# Patient Record
Sex: MALE | Race: WHITE | URBAN - METROPOLITAN AREA
[De-identification: names, ages, dates, MRNs, and addresses within clinical notes are randomized per-mention and may not be internally consistent; named-entity substitution may affect disease eponyms.]

---

## 2017-04-25 PROBLEM — G89.29 CHRONIC NECK PAIN: Status: ACTIVE | Noted: 2017-04-25

## 2017-04-25 PROBLEM — M54.2 CHRONIC NECK PAIN: Status: ACTIVE | Noted: 2017-04-25

## 2018-04-25 ENCOUNTER — OFFICE VISIT (OUTPATIENT)
Dept: FAMILY MEDICINE CLINIC | Age: 84
End: 2018-04-25
Payer: MEDICARE

## 2018-04-25 VITALS
HEART RATE: 82 BPM | WEIGHT: 184 LBS | HEIGHT: 73 IN | OXYGEN SATURATION: 98 % | SYSTOLIC BLOOD PRESSURE: 124 MMHG | BODY MASS INDEX: 24.39 KG/M2 | DIASTOLIC BLOOD PRESSURE: 70 MMHG | TEMPERATURE: 97.7 F

## 2018-04-25 DIAGNOSIS — R73.9 ELEVATED BLOOD SUGAR: ICD-10-CM

## 2018-04-25 DIAGNOSIS — D69.6 THROMBOCYTOPENIA (HCC): ICD-10-CM

## 2018-04-25 DIAGNOSIS — N18.30 CKD (CHRONIC KIDNEY DISEASE) STAGE 3, GFR 30-59 ML/MIN (HCC): ICD-10-CM

## 2018-04-25 DIAGNOSIS — E11.9 DIABETES MELLITUS TYPE 2, NONINSULIN DEPENDENT (HCC): ICD-10-CM

## 2018-04-25 DIAGNOSIS — I10 ESSENTIAL HYPERTENSION: Primary | ICD-10-CM

## 2018-04-25 LAB — GLUCOSE BLD-MCNC: 96 MG/DL

## 2018-04-25 PROCEDURE — 82962 GLUCOSE BLOOD TEST: CPT | Performed by: FAMILY MEDICINE

## 2018-04-25 PROCEDURE — G8427 DOCREV CUR MEDS BY ELIG CLIN: HCPCS | Performed by: FAMILY MEDICINE

## 2018-04-25 PROCEDURE — 99213 OFFICE O/P EST LOW 20 MIN: CPT | Performed by: FAMILY MEDICINE

## 2018-04-25 PROCEDURE — 1036F TOBACCO NON-USER: CPT | Performed by: FAMILY MEDICINE

## 2018-04-25 PROCEDURE — 1123F ACP DISCUSS/DSCN MKR DOCD: CPT | Performed by: FAMILY MEDICINE

## 2018-04-25 PROCEDURE — G8420 CALC BMI NORM PARAMETERS: HCPCS | Performed by: FAMILY MEDICINE

## 2018-04-25 PROCEDURE — 4040F PNEUMOC VAC/ADMIN/RCVD: CPT | Performed by: FAMILY MEDICINE

## 2018-04-25 RX ORDER — LISINOPRIL 10 MG/1
10 TABLET ORAL DAILY
COMMUNITY

## 2018-11-26 ENCOUNTER — TELEPHONE (OUTPATIENT)
Dept: FAMILY MEDICINE CLINIC | Age: 84
End: 2018-11-26

## 2019-10-08 ENCOUNTER — TELEPHONE (OUTPATIENT)
Dept: ADMINISTRATIVE | Age: 85
End: 2019-10-08

## 2023-07-12 ENCOUNTER — TELEPHONE (OUTPATIENT)
Dept: ADMINISTRATIVE | Age: 89
End: 2023-07-12

## 2023-07-12 NOTE — TELEPHONE ENCOUNTER
Patient Appointment Form:      PCP: Dr Mariaa Atkins  Referring: ciaran    Has the Patient:    Seen a Cardiologist? yes    date:May 2023  Physician:April  location:[facility]    Had a heart catheterization? no    Had heart surgery? no    Had a stress test or nuclear stress test? no    Had an echocardiogram? yes   date: 5/2023   facility name:  [facility]    Had a vascular ultrasound? no    Had a 24/48 heart monitor or extended cardiac event monitor? no    Had recent blood work in the last 6 months? yes    date: 5/2023    ordering physician: Rock Mccall    Had a pacemaker/ICD/ILR implant? no    Seen an Electrophysiologist? no        Will send records via: in [address] [place] [address]      Date & time of appointment:  7/27/2023 9:45 Dr Wendy Roldan

## 2023-07-26 PROBLEM — M54.2 CHRONIC NECK PAIN: Status: RESOLVED | Noted: 2017-04-25 | Resolved: 2023-07-26

## 2023-07-26 PROBLEM — G89.29 CHRONIC NECK PAIN: Status: RESOLVED | Noted: 2017-04-25 | Resolved: 2023-07-26

## 2023-07-27 ENCOUNTER — OFFICE VISIT (OUTPATIENT)
Dept: CARDIOLOGY CLINIC | Age: 89
End: 2023-07-27
Payer: MEDICARE

## 2023-07-27 VITALS
DIASTOLIC BLOOD PRESSURE: 98 MMHG | HEIGHT: 72 IN | BODY MASS INDEX: 23.98 KG/M2 | SYSTOLIC BLOOD PRESSURE: 141 MMHG | WEIGHT: 177 LBS | HEART RATE: 121 BPM | RESPIRATION RATE: 18 BRPM

## 2023-07-27 DIAGNOSIS — I48.92 ATRIAL FLUTTER, UNSPECIFIED TYPE (HCC): ICD-10-CM

## 2023-07-27 PROBLEM — I48.91 ATRIAL FIBRILLATION (HCC): Status: ACTIVE | Noted: 2023-07-27

## 2023-07-27 PROBLEM — I48.91 ATRIAL FIBRILLATION (HCC): Status: RESOLVED | Noted: 2023-07-27 | Resolved: 2023-07-27

## 2023-07-27 PROCEDURE — 93000 ELECTROCARDIOGRAM COMPLETE: CPT | Performed by: INTERNAL MEDICINE

## 2023-07-27 PROCEDURE — 1123F ACP DISCUSS/DSCN MKR DOCD: CPT | Performed by: INTERNAL MEDICINE

## 2023-07-27 PROCEDURE — 99205 OFFICE O/P NEW HI 60 MIN: CPT | Performed by: INTERNAL MEDICINE

## 2023-07-27 RX ORDER — METOPROLOL SUCCINATE 50 MG/1
50 TABLET, EXTENDED RELEASE ORAL 2 TIMES DAILY
COMMUNITY
Start: 2023-06-29

## 2023-08-02 VITALS — BODY MASS INDEX: 24.24 KG/M2 | WEIGHT: 179 LBS | HEIGHT: 72 IN

## 2023-08-02 NOTE — PROGRESS NOTES
[address] [employer] PRE-ADMISSION TESTING INSTRUCTIONS    The Preadmission Testing patient is instructed accordingly using the following criteria (check applicable):    ARRIVAL INSTRUCTIONS:  [x] Parking the day of Surgery is located in the Main Entrance lot. Upon entering the door, make an immediate right to the surgery reception desk    [x] Bring photo ID and insurance card    [] Bring in a copy of Living will or Durable Power of  papers. [x] Please be sure to arrange for responsible adult to provide transportation to and from the hospital    [x] Please arrange for responsible adult to be with you for the 24 hour period post procedure due to having anesthesia    [x] If you awake am of surgery not feeling well or have temperature >100 please call [phone]    GENERAL INSTRUCTIONS:    [x] Nothing by mouth after midnight, including gum, candy, mints or water    [x] You may brush your teeth, but do not swallow any water    [x] Take medications as instructed with 1-2 oz of water    [x] Stop herbal supplements and vitamins 5 days prior to procedure    [x] Follow preop dosing of blood thinners per physician instructions    [] Take 1/2 dose of evening insulin, but no insulin after midnight    [] No oral diabetic medications after midnight    [] If diabetic and have low blood sugar or feel symptomatic, take 1-2oz apple juice only    [] Bring inhalers day of surgery    [] Bring C-PAP/ Bi-Pap day of surgery    [] Bring urine specimen day of surgery    [x] Shower or bath with soap, lather and rinse well, AM of Surgery, no lotion, powders or creams to surgical site    [] Follow bowel prep as instructed per surgeon    [x] No tobacco products within 24 hours of surgery     [x] No alcohol or illegal drug use within 24 hours of surgery.     [x] Jewelry, body piercing's, eyeglasses, contact lenses and dentures are not permitted into surgery (bring cases)      [x] Please do not wear any nail polish,

## 2023-08-04 ENCOUNTER — HOSPITAL ENCOUNTER (OUTPATIENT)
Dept: INPATIENT UNIT | Age: 89
Setting detail: OUTPATIENT SURGERY
Discharge: HOME | End: 2023-08-04

## 2023-08-04 ENCOUNTER — TELEPHONE (OUTPATIENT)
Dept: CARDIOLOGY CLINIC | Age: 89
End: 2023-08-04

## 2023-08-04 LAB
EKG ATRIAL RATE: 63 BPM
EKG P AXIS: 103 DEGREES
EKG P-R INTERVAL: 206 MS
EKG Q-T INTERVAL: 464 MS
EKG QRS DURATION: 104 MS
EKG QTC CALCULATION (BAZETT): 474 MS
EKG R AXIS: -36 DEGREES
EKG T AXIS: 0 DEGREES
EKG VENTRICULAR RATE: 63 BPM

## 2023-08-04 NOTE — TELEPHONE ENCOUNTER
Patient states he went to get his cardioversion today but he is back in sinus rhythm. He would like to know what to do next. Please advise.

## 2023-08-04 NOTE — PROGRESS NOTES
Patient in [address] [place] [address]. Notified Dr. Quiñones. Procedure cancelled and Dr. Quiñones spoke with family.

## 2023-09-06 ENCOUNTER — OFFICE VISIT (OUTPATIENT)
Dept: CARDIOLOGY CLINIC | Age: 89
End: 2023-09-06
Payer: MEDICARE

## 2023-09-06 VITALS
RESPIRATION RATE: 18 BRPM | HEIGHT: 72 IN | HEART RATE: 112 BPM | DIASTOLIC BLOOD PRESSURE: 90 MMHG | SYSTOLIC BLOOD PRESSURE: 146 MMHG | WEIGHT: 173.2 LBS | BODY MASS INDEX: 23.46 KG/M2

## 2023-09-06 DIAGNOSIS — I48.92 ATRIAL FLUTTER, UNSPECIFIED TYPE (HCC): Primary | ICD-10-CM

## 2023-09-06 PROCEDURE — 99214 OFFICE O/P EST MOD 30 MIN: CPT | Performed by: INTERNAL MEDICINE

## 2023-09-06 PROCEDURE — 1123F ACP DISCUSS/DSCN MKR DOCD: CPT | Performed by: INTERNAL MEDICINE

## 2023-09-06 PROCEDURE — 93000 ELECTROCARDIOGRAM COMPLETE: CPT | Performed by: INTERNAL MEDICINE

## 2023-09-06 NOTE — PROGRESS NOTES
oriented to person, place, and time. Skin: Skin is warm and dry. No rash noted. Psychiatric: Normal mood and affect. Behavior is normal.     EKG:  atrial fibrillation with AVB.     ASSESSMENT AND PLAN:  Patient Active Problem List   Diagnosis    Essential hypertension    CKD (chronic kidney disease) stage 3, GFR 30-59 ml/min (HCC)    Thrombocytopenia (HCC)    Diabetes mellitus type 2, noninsulin dependent (HCC)    Atrial flutter (HCC)     Paroxysmal atrial fib/ flutter, non valvular, asymptomatic, elevated CHADSVASC  On adjusted dose NOAC,  Echo pending   No missed eliquis doses            Yoanna Mortensen M.D  [facility]

## 2023-09-08 ENCOUNTER — HOSPITAL ENCOUNTER (OUTPATIENT)
Dept: CARDIOLOGY | Age: 89
Discharge: HOME | End: 2023-09-08
Payer: MEDICARE

## 2023-09-08 DIAGNOSIS — I48.92 ATRIAL FLUTTER, UNSPECIFIED TYPE (HCC): ICD-10-CM

## 2023-09-08 PROCEDURE — 93306 TTE W/DOPPLER COMPLETE: CPT

## 2023-09-11 ENCOUNTER — TELEPHONE (OUTPATIENT)
Dept: CARDIOLOGY CLINIC | Age: 89
End: 2023-09-11

## 2023-09-11 NOTE — TELEPHONE ENCOUNTER
----- Message from Jeferson Alegria MD sent at 9/11/2023  7:22 AM EDT -----  Echo as expected  Ov 6 moths if he does not have one

## 2024-03-07 ENCOUNTER — OFFICE VISIT (OUTPATIENT)
Dept: CARDIOLOGY CLINIC | Age: 89
End: 2024-03-07
Payer: MEDICARE

## 2024-03-07 VITALS
HEART RATE: 74 BPM | DIASTOLIC BLOOD PRESSURE: 81 MMHG | HEIGHT: 72 IN | RESPIRATION RATE: 18 BRPM | SYSTOLIC BLOOD PRESSURE: 132 MMHG | WEIGHT: 180 LBS | BODY MASS INDEX: 24.38 KG/M2

## 2024-03-07 DIAGNOSIS — I48.92 ATRIAL FLUTTER, UNSPECIFIED TYPE (HCC): Primary | ICD-10-CM

## 2024-03-07 PROCEDURE — 1123F ACP DISCUSS/DSCN MKR DOCD: CPT | Performed by: INTERNAL MEDICINE

## 2024-03-07 PROCEDURE — 93000 ELECTROCARDIOGRAM COMPLETE: CPT | Performed by: INTERNAL MEDICINE

## 2024-03-07 PROCEDURE — 99213 OFFICE O/P EST LOW 20 MIN: CPT | Performed by: INTERNAL MEDICINE

## 2024-03-07 NOTE — PROGRESS NOTES
Chief Complaint   Patient presents with    Atrial Flutter              Patient Active Problem List    Diagnosis Date Noted    Atrial flutter ([facility]) 07/27/2023    CKD (chronic kidney disease) stage 3, GFR 30-59 ml/min ([facility]) 04/25/2018    Thrombocytopenia ([facility]) 04/25/2018    Diabetes mellitus type 2, noninsulin dependent ([facility]) 04/25/2018    Essential hypertension 04/06/2016       Current Outpatient Medications   Medication Sig Dispense Refill    metoprolol succinate (TOPROL XL) 50 MG extended release tablet 1 tablet  in the morning and 1 tablet in the evening.      apixaban (ELIQUIS) 2.5 MG TABS tablet Take 1 tablet by mouth 2 times daily       No current facility-administered medications for this visit.        No Known Allergies    Vitals:    03/07/24 1011   Resp: 18   Weight: 81.6 kg (180 lb)   Height: 1.829 m (6')                 SUBJECTIVE: Marcello Edwards presents to the office today for f/u.        He complains of  no issues  and denies   dyspnea, exertional chest pressure/discomfort, fatigue, irregular heart beat, lower extremity edema, near-syncope, orthopnea, palpitations, paroxysmal nocturnal dyspnea, and syncope  Vibrant nonagenarian.              Physical Exam   Resp 18   Ht 1.829 m (6')   Wt 81.6 kg (180 lb)   BMI 24.41 kg/m²   Constitutional: Oriented to person, place, and time. Well-developed and well-nourished. No distress.    Neck: No JVD present. Carotid bruit is not present.   Cardiovascular:  normal  rate, irregular rhythm, normal heart sounds and intact distal pulses.  No gallop and no friction rub.  No murmur heard.  Pulmonary/Chest: Effort normal and breath sounds normal.   Abdominal: Soft. Bowel sounds are normal. No distension and no mass.   Musculoskeletal: No edema   Neurological: Alert and oriented to person, place, and time.   Skin: Skin is warm and dry.   Psychiatric: Normal mood and affect. Behavior is normal.     EKG:  atrial fibrillation , LAD, NSST    ASSESSMENT AND PLAN:  Patient

## 2024-09-05 ENCOUNTER — TELEPHONE (OUTPATIENT)
Dept: CARDIOLOGY CLINIC | Age: 89
End: 2024-09-05

## 2024-09-09 ENCOUNTER — OFFICE VISIT (OUTPATIENT)
Dept: CARDIOLOGY CLINIC | Age: 89
End: 2024-09-09

## 2024-09-09 VITALS
BODY MASS INDEX: 22.98 KG/M2 | HEART RATE: 90 BPM | RESPIRATION RATE: 18 BRPM | DIASTOLIC BLOOD PRESSURE: 68 MMHG | HEIGHT: 73 IN | WEIGHT: 173.4 LBS | SYSTOLIC BLOOD PRESSURE: 130 MMHG

## 2024-09-09 DIAGNOSIS — I48.92 ATRIAL FLUTTER, UNSPECIFIED TYPE (HCC): Primary | ICD-10-CM

## 2024-09-09 RX ORDER — DILTIAZEM HCL 60 MG
60 TABLET ORAL
COMMUNITY

## 2024-11-25 ENCOUNTER — HOSPITAL ENCOUNTER (INPATIENT)
Age: 89
LOS: 2 days | Discharge: ANOTHER HOSPITAL | DRG: 256 | End: 2024-11-27
Attending: EMERGENCY MEDICINE | Admitting: INTERNAL MEDICINE
Payer: OTHER GOVERNMENT

## 2024-11-25 ENCOUNTER — OFFICE VISIT (OUTPATIENT)
Dept: FAMILY MEDICINE CLINIC | Age: 89
End: 2024-11-25

## 2024-11-25 ENCOUNTER — APPOINTMENT (OUTPATIENT)
Dept: GENERAL RADIOLOGY | Age: 89
DRG: 256 | End: 2024-11-25
Attending: INTERNAL MEDICINE
Payer: OTHER GOVERNMENT

## 2024-11-25 VITALS
SYSTOLIC BLOOD PRESSURE: 122 MMHG | BODY MASS INDEX: 24.92 KG/M2 | DIASTOLIC BLOOD PRESSURE: 78 MMHG | OXYGEN SATURATION: 97 % | RESPIRATION RATE: 15 BRPM | WEIGHT: 178 LBS | TEMPERATURE: 97.8 F | HEART RATE: 132 BPM | HEIGHT: 71 IN

## 2024-11-25 DIAGNOSIS — I42.9 CARDIOMYOPATHY, UNSPECIFIED TYPE (HCC): ICD-10-CM

## 2024-11-25 DIAGNOSIS — I48.92 ATRIAL FLUTTER, UNSPECIFIED TYPE (HCC): ICD-10-CM

## 2024-11-25 DIAGNOSIS — I48.3 TYPICAL ATRIAL FLUTTER (HCC): ICD-10-CM

## 2024-11-25 DIAGNOSIS — L03.031 CELLULITIS OF FIFTH TOE OF RIGHT FOOT: Primary | ICD-10-CM

## 2024-11-25 DIAGNOSIS — E11.9 DIABETES MELLITUS TYPE 2, NONINSULIN DEPENDENT (HCC): ICD-10-CM

## 2024-11-25 DIAGNOSIS — N18.30 STAGE 3 CHRONIC KIDNEY DISEASE, UNSPECIFIED WHETHER STAGE 3A OR 3B CKD (HCC): ICD-10-CM

## 2024-11-25 DIAGNOSIS — L03.031 CELLULITIS AND ABSCESS OF TOE OF RIGHT FOOT: Primary | ICD-10-CM

## 2024-11-25 DIAGNOSIS — L02.611 CELLULITIS AND ABSCESS OF TOE OF RIGHT FOOT: Primary | ICD-10-CM

## 2024-11-25 DIAGNOSIS — L03.115 CELLULITIS OF RIGHT FOOT: ICD-10-CM

## 2024-11-25 DIAGNOSIS — S92.351A DISPLACED FRACTURE OF FIFTH METATARSAL BONE, RIGHT FOOT, INITIAL ENCOUNTER FOR CLOSED FRACTURE: ICD-10-CM

## 2024-11-25 PROBLEM — I10 PRIMARY HYPERTENSION: Status: ACTIVE | Noted: 2024-11-25

## 2024-11-25 PROBLEM — R33.9 URINARY RETENTION: Status: ACTIVE | Noted: 2024-11-25

## 2024-11-25 PROBLEM — I48.91 ATRIAL FIBRILLATION (HCC): Status: ACTIVE | Noted: 2024-11-25

## 2024-11-25 LAB
ANION GAP SERPL CALCULATED.3IONS-SCNC: 12 MMOL/L (ref 7–16)
BASOPHILS # BLD: 0.05 K/UL (ref 0–0.2)
BASOPHILS NFR BLD: 0 % (ref 0–2)
BUN SERPL-MCNC: 21 MG/DL (ref 6–23)
CALCIUM SERPL-MCNC: 9.5 MG/DL (ref 8.6–10.2)
CHLORIDE SERPL-SCNC: 98 MMOL/L (ref 98–107)
CO2 SERPL-SCNC: 24 MMOL/L (ref 22–29)
CREAT SERPL-MCNC: 1.2 MG/DL (ref 0.7–1.2)
CRP SERPL HS-MCNC: 47 MG/L (ref 0–5)
EOSINOPHIL # BLD: 0.08 K/UL (ref 0.05–0.5)
EOSINOPHILS RELATIVE PERCENT: 1 % (ref 0–6)
ERYTHROCYTE [DISTWIDTH] IN BLOOD BY AUTOMATED COUNT: 13.3 % (ref 11.5–15)
ERYTHROCYTE [SEDIMENTATION RATE] IN BLOOD BY WESTERGREN METHOD: 22 MM/HR (ref 0–15)
GFR, ESTIMATED: 58 ML/MIN/1.73M2
GLUCOSE SERPL-MCNC: 295 MG/DL (ref 74–99)
HCT VFR BLD AUTO: 43.6 % (ref 37–54)
HGB BLD-MCNC: 13.9 G/DL (ref 12.5–16.5)
IMM GRANULOCYTES # BLD AUTO: 0.04 K/UL (ref 0–0.58)
IMM GRANULOCYTES NFR BLD: 0 % (ref 0–5)
LACTATE BLDV-SCNC: 1.4 MMOL/L (ref 0.5–1.9)
LACTATE BLDV-SCNC: 2.3 MMOL/L (ref 0.5–1.9)
LYMPHOCYTES NFR BLD: 0.98 K/UL (ref 1.5–4)
LYMPHOCYTES RELATIVE PERCENT: 8 % (ref 20–42)
MCH RBC QN AUTO: 30.5 PG (ref 26–35)
MCHC RBC AUTO-ENTMCNC: 31.9 G/DL (ref 32–34.5)
MCV RBC AUTO: 95.8 FL (ref 80–99.9)
MONOCYTES NFR BLD: 0.89 K/UL (ref 0.1–0.95)
MONOCYTES NFR BLD: 7 % (ref 2–12)
NEUTROPHILS NFR BLD: 84 % (ref 43–80)
NEUTS SEG NFR BLD: 10.58 K/UL (ref 1.8–7.3)
PLATELET # BLD AUTO: 180 K/UL (ref 130–450)
PMV BLD AUTO: 9.9 FL (ref 7–12)
POTASSIUM SERPL-SCNC: 4.5 MMOL/L (ref 3.5–5)
RBC # BLD AUTO: 4.55 M/UL (ref 3.8–5.8)
SODIUM SERPL-SCNC: 134 MMOL/L (ref 132–146)
WBC OTHER # BLD: 12.6 K/UL (ref 4.5–11.5)

## 2024-11-25 PROCEDURE — 99205 OFFICE O/P NEW HI 60 MIN: CPT | Performed by: FAMILY MEDICINE

## 2024-11-25 PROCEDURE — 86140 C-REACTIVE PROTEIN: CPT

## 2024-11-25 PROCEDURE — 99223 1ST HOSP IP/OBS HIGH 75: CPT | Performed by: INTERNAL MEDICINE

## 2024-11-25 PROCEDURE — 2500000003 HC RX 250 WO HCPCS: Performed by: EMERGENCY MEDICINE

## 2024-11-25 PROCEDURE — 87040 BLOOD CULTURE FOR BACTERIA: CPT

## 2024-11-25 PROCEDURE — 99285 EMERGENCY DEPT VISIT HI MDM: CPT

## 2024-11-25 PROCEDURE — 85025 COMPLETE CBC W/AUTO DIFF WBC: CPT

## 2024-11-25 PROCEDURE — 96375 TX/PRO/DX INJ NEW DRUG ADDON: CPT

## 2024-11-25 PROCEDURE — 6360000002 HC RX W HCPCS: Performed by: EMERGENCY MEDICINE

## 2024-11-25 PROCEDURE — 96374 THER/PROPH/DIAG INJ IV PUSH: CPT

## 2024-11-25 PROCEDURE — 6370000000 HC RX 637 (ALT 250 FOR IP): Performed by: NURSE PRACTITIONER

## 2024-11-25 PROCEDURE — 85652 RBC SED RATE AUTOMATED: CPT

## 2024-11-25 PROCEDURE — 83605 ASSAY OF LACTIC ACID: CPT

## 2024-11-25 PROCEDURE — 2580000003 HC RX 258: Performed by: EMERGENCY MEDICINE

## 2024-11-25 PROCEDURE — 73630 X-RAY EXAM OF FOOT: CPT

## 2024-11-25 PROCEDURE — 2060000000 HC ICU INTERMEDIATE R&B

## 2024-11-25 PROCEDURE — 80048 BASIC METABOLIC PNL TOTAL CA: CPT

## 2024-11-25 PROCEDURE — 1123F ACP DISCUSS/DSCN MKR DOCD: CPT | Performed by: FAMILY MEDICINE

## 2024-11-25 RX ORDER — DILTIAZEM HYDROCHLORIDE 5 MG/ML
10 INJECTION INTRAVENOUS ONCE
Status: COMPLETED | OUTPATIENT
Start: 2024-11-25 | End: 2024-11-25

## 2024-11-25 RX ORDER — DILTIAZEM HYDROCHLORIDE 60 MG/1
60 CAPSULE, EXTENDED RELEASE ORAL 2 TIMES DAILY
Status: ON HOLD | COMMUNITY
Start: 2024-10-24 | End: 2024-12-01 | Stop reason: HOSPADM

## 2024-11-25 RX ORDER — VANCOMYCIN 1.5 G/300ML
20 INJECTION, SOLUTION INTRAVENOUS ONCE
Status: COMPLETED | OUTPATIENT
Start: 2024-11-25 | End: 2024-11-25

## 2024-11-25 RX ORDER — MULTIVITAMIN WITH IRON
1 TABLET ORAL DAILY
COMMUNITY

## 2024-11-25 RX ORDER — SODIUM CHLORIDE 0.9 % (FLUSH) 0.9 %
5-40 SYRINGE (ML) INJECTION EVERY 12 HOURS SCHEDULED
Status: DISCONTINUED | OUTPATIENT
Start: 2024-11-25 | End: 2024-11-27 | Stop reason: HOSPADM

## 2024-11-25 RX ORDER — CALCIUM CARBONATE 500 MG/1
500 TABLET, CHEWABLE ORAL 3 TIMES DAILY PRN
Status: DISCONTINUED | OUTPATIENT
Start: 2024-11-25 | End: 2024-11-27 | Stop reason: HOSPADM

## 2024-11-25 RX ORDER — ACETAMINOPHEN 650 MG/1
650 SUPPOSITORY RECTAL EVERY 6 HOURS PRN
Status: DISCONTINUED | OUTPATIENT
Start: 2024-11-25 | End: 2024-11-27 | Stop reason: HOSPADM

## 2024-11-25 RX ORDER — ACETAMINOPHEN 325 MG/1
650 TABLET ORAL EVERY 6 HOURS PRN
Status: DISCONTINUED | OUTPATIENT
Start: 2024-11-25 | End: 2024-11-27 | Stop reason: HOSPADM

## 2024-11-25 RX ORDER — DILTIAZEM HYDROCHLORIDE 30 MG/1
60 TABLET, FILM COATED ORAL
Status: DISCONTINUED | OUTPATIENT
Start: 2024-11-26 | End: 2024-11-26

## 2024-11-25 RX ORDER — 0.9 % SODIUM CHLORIDE 0.9 %
500 INTRAVENOUS SOLUTION INTRAVENOUS ONCE
Status: COMPLETED | OUTPATIENT
Start: 2024-11-25 | End: 2024-11-25

## 2024-11-25 RX ORDER — METOPROLOL SUCCINATE 100 MG/1
100 TABLET, EXTENDED RELEASE ORAL DAILY
Status: DISCONTINUED | OUTPATIENT
Start: 2024-11-25 | End: 2024-11-26

## 2024-11-25 RX ORDER — ONDANSETRON 2 MG/ML
4 INJECTION INTRAMUSCULAR; INTRAVENOUS EVERY 6 HOURS PRN
Status: DISCONTINUED | OUTPATIENT
Start: 2024-11-25 | End: 2024-11-27 | Stop reason: HOSPADM

## 2024-11-25 RX ORDER — ONDANSETRON 4 MG/1
4 TABLET, ORALLY DISINTEGRATING ORAL EVERY 8 HOURS PRN
Status: DISCONTINUED | OUTPATIENT
Start: 2024-11-25 | End: 2024-11-27 | Stop reason: HOSPADM

## 2024-11-25 RX ORDER — METOPROLOL SUCCINATE 100 MG/1
100 TABLET, EXTENDED RELEASE ORAL DAILY
Status: ON HOLD | COMMUNITY
Start: 2024-08-22 | End: 2024-12-01 | Stop reason: HOSPADM

## 2024-11-25 RX ORDER — SODIUM CHLORIDE 0.9 % (FLUSH) 0.9 %
5-40 SYRINGE (ML) INJECTION PRN
Status: DISCONTINUED | OUTPATIENT
Start: 2024-11-25 | End: 2024-11-27 | Stop reason: HOSPADM

## 2024-11-25 RX ORDER — POLYETHYLENE GLYCOL 3350 17 G/17G
17 POWDER, FOR SOLUTION ORAL DAILY PRN
Status: DISCONTINUED | OUTPATIENT
Start: 2024-11-25 | End: 2024-11-27 | Stop reason: HOSPADM

## 2024-11-25 RX ORDER — SODIUM CHLORIDE 9 MG/ML
INJECTION, SOLUTION INTRAVENOUS PRN
Status: DISCONTINUED | OUTPATIENT
Start: 2024-11-25 | End: 2024-11-27 | Stop reason: HOSPADM

## 2024-11-25 RX ADMIN — WATER 2000 MG: 1 INJECTION INTRAMUSCULAR; INTRAVENOUS; SUBCUTANEOUS at 14:44

## 2024-11-25 RX ADMIN — SODIUM CHLORIDE 500 ML: 9 INJECTION, SOLUTION INTRAVENOUS at 18:58

## 2024-11-25 RX ADMIN — DILTIAZEM HYDROCHLORIDE 10 MG: 5 INJECTION, SOLUTION INTRAVENOUS at 18:11

## 2024-11-25 RX ADMIN — VANCOMYCIN 1500 MG: 1.5 INJECTION, SOLUTION INTRAVENOUS at 14:51

## 2024-11-25 RX ADMIN — CALCIUM CARBONATE 500 MG: 500 TABLET, CHEWABLE ORAL at 23:11

## 2024-11-25 RX ADMIN — SODIUM CHLORIDE 5 MG/HR: 900 INJECTION, SOLUTION INTRAVENOUS at 21:08

## 2024-11-25 RX ADMIN — DILTIAZEM HYDROCHLORIDE 10 MG: 5 INJECTION, SOLUTION INTRAVENOUS at 19:01

## 2024-11-25 ASSESSMENT — PAIN - FUNCTIONAL ASSESSMENT: PAIN_FUNCTIONAL_ASSESSMENT: 0-10

## 2024-11-25 ASSESSMENT — LIFESTYLE VARIABLES
HOW MANY STANDARD DRINKS CONTAINING ALCOHOL DO YOU HAVE ON A TYPICAL DAY: PATIENT DOES NOT DRINK
HOW OFTEN DO YOU HAVE A DRINK CONTAINING ALCOHOL: NEVER

## 2024-11-25 ASSESSMENT — PAIN SCALES - GENERAL: PAINLEVEL_OUTOF10: 7

## 2024-11-25 NOTE — H&P
[facility] Hospitalist Group History and Physical      CHIEF COMPLAINT:  wound check     History of Present Illness:      This is a 89 year old male with past medical history of Pagets disease, hypertension, afib on eliquis, and urinary retention who presents to ER for wound check of right 5th toe. He was at [facility] PCP due to toe injury, states he cut it a couple weeks ago and it has been bleeding. He started taking Amoxicillin left over prescription from a previous bacterial infection. He drove himself to ER from PCP office. Vitals stable. Lab workup: LA 2.3, Glucose 295, WBC 12.6, xray right foot with mildly displaced fracture at proximal 5th metatarsal. He was given vancomycin and ceftriaxone in ER. Decision to admit with podiatry consult.     Informant(s) for H&P: patient, epic     REVIEW OF SYSTEMS:  A comprehensive review of systems was negative except for: what is in the HPI    PMH:  Past Medical History:   Diagnosis Date    A-fib ([facility])     Hx of colonoscopy 10/01/2010    Dr.Sayed Mcnally-NEGATIVE for polyps    Hypertension     Paget's disease of bone     evaluated by endocrine in [facility]    Urinary retention with incomplete bladder emptying     self caths       Surgical History:  Past Surgical History:   Procedure Laterality Date    BLADDER STONE REMOVAL       -cystoscopy    CATARACT REMOVAL WITH IMPLANT Bilateral     local opthalmology    HERNIA REPAIR      TOTAL KNEE ARTHROPLASTY Left     approx. 2018       Medications Prior to Admission:    Prior to Admission medications    Medication Sig Start Date End Date Taking? Authorizing Provider   metoprolol succinate (TOPROL XL) 100 MG extended release tablet  8/22/24   ProviderLeonarda MD   dilTIAZem (CARDIZEM) 60 MG tablet Take 1 tablet by mouth daily (with breakfast)    Leonarda Fields MD   metoprolol succinate (TOPROL XL) 50 MG extended release tablet 1 tablet  in the morning and 1 tablet in the evening.  Patient not  extremity edema  Skin:  Warm and dry. Right fifth toe with erythema and area of necrosis noted  Vascular: 2/4 Dorsalis Pedis pulses bilaterally.  Neuro:  Cranial nerves 2-12 grossly intact, no focal weakness or change in sensation noted.  Extraocular muscles intact.  Pupils equal, round, reactive to light.            I agree with the assessment and plan of Mel Felder, APRN - NP    Sepsis(leukocytosis, hr over90, tachypnea, infection)POA  Right 5th metatarsal fx, prox  Cellulitis of right 5th toe  Atrial fib  Urinary retention  htn    Decision to admit    Electronically signed by Anson Delacruz D.O.  Hospitalist  [facility] at [facility]

## 2024-11-25 NOTE — ED PROVIDER NOTES
is 2+ dorsalis pedis and posterior tibial pulse present.  Differential diagnose includes known to fracture, contusion, cellulitis, abscess.  The patient does have x-ray demonstrating fracture of the base of fifth metatarsal this is not under the open area.  The patient did a CBC with mild leukocytosis of 12.6, CMP with mild hyperglycemia glucose of 295, no evidence of DKA, anion gap 12, CO2 of 24 the patient was treated with mycin and Rocephin in the emergency department.  The patient will be admitted for further treatment and evaluation.    Chronic conditions:   Past Medical History:   Diagnosis Date    A-fib (HCC)     Hx of colonoscopy 10/01/2010    Dr.Sayed Mcnally-NEGATIVE for polyps    Hypertension     Paget's disease of bone     evaluated by endocrine in [facility]    Urinary retention with incomplete bladder emptying     self caths       Records reviewed: Reviewed patient's medical record the patient was seen by EUNICE Grissom the urgent care today the patient was referred into the emergency department for further treatment and evaluation.      Patient treated with   Medications   vancomycin (VANCOCIN) 1500 mg in 300 mL IVPB (1,500 mg IntraVENous New Bag 11/25/24 1451)   cefTRIAXone (ROCEPHIN) 2,000 mg in sterile water 20 mL IV syringe (2,000 mg IntraVENous Given 11/25/24 1444)     Discussion with health care provider: Spoke with Dr. Delacruz he will admit the patient          EMERGENCY DEPARTMENT COURSE    Vitals:    Vitals:    11/25/24 1222 11/25/24 1230 11/25/24 1253 11/25/24 1347   BP:  121/78 (!) 147/96 (!) 143/87   Pulse:  93 88 81   Resp:  16 29 20   Temp:  97.9 °F (36.6 °C)     TempSrc:  Oral     SpO2:  97% 93% 97%   Weight: 80.7 kg (178 lb)      Height: 1.803 m (5' 11\")                  I am the Primary Clinician of Record.    FINAL IMPRESSION      1. Cellulitis of fifth toe of right foot    2. Displaced fracture of fifth metatarsal bone, right foot, initial encounter for closed fracture           DISPOSITION/PLAN      Decision To Admit 11/25/2024 02:49:06 PM    Patient's disposition: Admit to telemetry  Patient's condition is stable.           (Please note that portions of this note were completed with a voice recognition program.  Efforts were made to edit the dictations but occasionally words are mis-transcribed.)    Silvia Washington DO (electronically signed)       Silvia Washington DO  11/25/24 2009

## 2024-11-25 NOTE — PROGRESS NOTES
[facility]    [facility] presents to the office today for   Chief Complaint   Patient presents with    Toe Injury     Patient stated, \"I noticed a cut to my right pinky toe a couple of weeks ago. It's been bleeding for two weeks.\"     Here for R pinky toe complaint  Reports he cut it 2 weeks ago  Using some wound care dressing he had at home  Also started taking Amoxicillin on his own    Afib w RVR  He reports he is always in afib  Dismissive of it as an issue      Review of Systems     /78 (Site: Left Upper Arm, Position: Sitting)   Pulse (!) 132 Comment: Provider notified.  Temp 97.8 °F (36.6 °C) (Temporal)   Resp 15   Ht 1.803 m (5' 11\") Comment: Patient reported.  Wt 80.7 kg (178 lb)   SpO2 97%   BMI 24.83 kg/m²   Physical Exam  Constitutional:       Appearance: Normal appearance.   HENT:      Head: Normocephalic and atraumatic.   Eyes:      Extraocular Movements: Extraocular movements intact.      Conjunctiva/sclera: Conjunctivae normal.   Cardiovascular:      Rate and Rhythm: Tachycardia present. Rhythm irregular.   Pulmonary:      Effort: Pulmonary effort is normal.      Breath sounds: Normal breath sounds.   Skin:     General: Skin is warm.   Neurological:      Mental Status: He is alert and oriented to person, place, and time.   Psychiatric:         Mood and Affect: Mood normal.         Behavior: Behavior normal.       Pt agreed to take clinical picture and understands it will not be stored on my phone, only present in the medical record.         Current Outpatient Medications:     metoprolol succinate (TOPROL XL) 100 MG extended release tablet, , Disp: , Rfl:     apixaban (ELIQUIS) 2.5 MG TABS tablet, Take 1 tablet by mouth 2 times daily, Disp: , Rfl:     dilTIAZem (CARDIZEM) 60 MG tablet, Take 1 tablet by mouth daily (with breakfast), Disp: , Rfl:     metoprolol succinate (TOPROL XL) 50 MG extended release tablet, 1 tablet  in the morning and 1 tablet in the evening. (Patient

## 2024-11-25 NOTE — ED NOTES
The following labs were labeled with appropriate pt sticker and tubed to lab:     [] Blue     [x] Lavender   [] on ice  [x] Green/yellow  [] Green/black [] on ice  [x] Grey  [x] on ice  [] Yellow  [] Red  [] Pink  [] Type/ Screen  [] ABG  [] VBG    [] COVID-19 swab    [] Rapid  [] PCR  [] Flu swab  [] Peds Viral Panel     [] Urine Sample  [] Fecal Sample  [] Pelvic Cultures  [x] Blood Cultures  [x] X 2  [] STREP Cultures  [] Wound Cultures

## 2024-11-25 NOTE — PROGRESS NOTES
Database initiated pharmacy and medications verified with the patient. He is A&O comes in from alone. He uses a cane and is RA at baseline.

## 2024-11-25 NOTE — CONSULTS
Department of Podiatry   Consult Note        Reason for Consult:  right 5th digit wound    CHIEF COMPLAINT:  right 5th digit wound    HISTORY OF PRESENT ILLNESS:                The patient is a 89 y.o. male with significant past medical history of HTN, pagets disease. Patient states that he cut his foot about 2 weeks ago. He is unsure how the cut happened and has been self treating at home. Patient states that it has continued to bleed. Patient states he started taking amoxicillin on his own. Patient does state [facility] not [facility] them for this issue. Patient otherwise has no complaints. I reviewed xrays and labs with patient and his daughter and discussed with them that we will get vascular studies to assess healing potential.  Patient denies any N/V/D/F/C/SOB/CP and has no other pedal complaints at this time.     Past Medical History:        Diagnosis Date    A-fib (HCC)     Hx of colonoscopy 10/01/2010    Dr.Sayed Mcnally-NEGATIVE for polyps    Hypertension     Paget's disease of bone     evaluated by endocrine in [facility]    Urinary retention with incomplete bladder emptying     self caths       Past Surgical History:        Procedure Laterality Date    BLADDER STONE REMOVAL       -cystoscopy    CATARACT REMOVAL WITH IMPLANT Bilateral     local opthalmology    HERNIA REPAIR      TOTAL KNEE ARTHROPLASTY Left     approx. 2018       Medications Prior to Admission:    Not in a hospital admission.    Allergies:  Patient has no known allergies.    Social History:   TOBACCO:   reports that he has never smoked. He has never used smokeless tobacco.  ETOH:   reports no history of alcohol use.  DRUGS:   Social History     Substance and Sexual Activity   Drug Use No       Family History:       Problem Relation Age of Onset    Other Mother     Cancer Father          REVIEW OF SYSTEMS:    CONSTITUTIONAL:  negative      LOWER EXTREMITY EXAMINATION     VASCULAR:  DP and PT pulses are  Wound  Right 5th digit infection  Right 5th metatarsal base fracture      PLAN:    Patient was examined and evaluated.  - Reviewed patient's recent lab results and pertinent diagnostic imaging.  - Reviewed ancillary service notes.  - ABX per ID   - Vascular: ordered and pending  - X-rays: Mildly displaced fracture at the proximal 5th metatarsal.   - Dressing: DSD, cam boot  - NWB to RLE  - Will await results of vascular studies prior to surgical intervention  - Discussed patient with Dr. Mccrary  - Will continue to follow patient while they are in-house.        Thank you for the opportunity to take part in the patient's care. Please do not hesitate to call for any questions or concerns.

## 2024-11-26 ENCOUNTER — APPOINTMENT (OUTPATIENT)
Dept: ULTRASOUND IMAGING | Age: 89
DRG: 256 | End: 2024-11-26
Attending: INTERNAL MEDICINE
Payer: OTHER GOVERNMENT

## 2024-11-26 PROBLEM — Z01.810 PREOPERATIVE CARDIOVASCULAR EXAMINATION: Status: ACTIVE | Noted: 2024-11-26

## 2024-11-26 PROBLEM — L97.509 TYPE 2 DIABETES MELLITUS WITH FOOT ULCER, WITHOUT LONG-TERM CURRENT USE OF INSULIN (HCC): Status: ACTIVE | Noted: 2024-11-26

## 2024-11-26 PROBLEM — S92.351A DISPLACED FRACTURE OF FIFTH METATARSAL BONE, RIGHT FOOT, INITIAL ENCOUNTER FOR CLOSED FRACTURE: Status: ACTIVE | Noted: 2024-11-26

## 2024-11-26 PROBLEM — E11.621 TYPE 2 DIABETES MELLITUS WITH FOOT ULCER, WITHOUT LONG-TERM CURRENT USE OF INSULIN (HCC): Status: ACTIVE | Noted: 2024-11-26

## 2024-11-26 PROBLEM — I42.9 CARDIOMYOPATHY (HCC): Status: ACTIVE | Noted: 2024-11-26

## 2024-11-26 PROBLEM — E78.00 PURE HYPERCHOLESTEROLEMIA: Status: ACTIVE | Noted: 2024-11-26

## 2024-11-26 LAB
ALBUMIN SERPL-MCNC: 3.5 G/DL (ref 3.5–5.2)
ALP SERPL-CCNC: 116 U/L (ref 40–129)
ALT SERPL-CCNC: 29 U/L (ref 0–40)
ANION GAP SERPL CALCULATED.3IONS-SCNC: 11 MMOL/L (ref 7–16)
AST SERPL-CCNC: 32 U/L (ref 0–39)
BASOPHILS # BLD: 0.05 K/UL (ref 0–0.2)
BASOPHILS NFR BLD: 1 % (ref 0–2)
BILIRUB SERPL-MCNC: 0.7 MG/DL (ref 0–1.2)
BUN SERPL-MCNC: 18 MG/DL (ref 6–23)
CALCIUM SERPL-MCNC: 9.1 MG/DL (ref 8.6–10.2)
CHLORIDE SERPL-SCNC: 102 MMOL/L (ref 98–107)
CO2 SERPL-SCNC: 21 MMOL/L (ref 22–29)
CREAT SERPL-MCNC: 1.2 MG/DL (ref 0.7–1.2)
EKG ATRIAL RATE: 277 BPM
EKG Q-T INTERVAL: 418 MS
EKG QRS DURATION: 108 MS
EKG QTC CALCULATION (BAZETT): 466 MS
EKG R AXIS: -64 DEGREES
EKG T AXIS: -97 DEGREES
EKG VENTRICULAR RATE: 75 BPM
EOSINOPHIL # BLD: 0.07 K/UL (ref 0.05–0.5)
EOSINOPHILS RELATIVE PERCENT: 1 % (ref 0–6)
ERYTHROCYTE [DISTWIDTH] IN BLOOD BY AUTOMATED COUNT: 13.4 % (ref 11.5–15)
GFR, ESTIMATED: 55 ML/MIN/1.73M2
GLUCOSE SERPL-MCNC: 205 MG/DL (ref 74–99)
HCT VFR BLD AUTO: 39.2 % (ref 37–54)
HGB BLD-MCNC: 13.2 G/DL (ref 12.5–16.5)
IMM GRANULOCYTES # BLD AUTO: 0.03 K/UL (ref 0–0.58)
IMM GRANULOCYTES NFR BLD: 0 % (ref 0–5)
LYMPHOCYTES NFR BLD: 1.09 K/UL (ref 1.5–4)
LYMPHOCYTES RELATIVE PERCENT: 12 % (ref 20–42)
MCH RBC QN AUTO: 31 PG (ref 26–35)
MCHC RBC AUTO-ENTMCNC: 33.7 G/DL (ref 32–34.5)
MCV RBC AUTO: 92 FL (ref 80–99.9)
MONOCYTES NFR BLD: 0.75 K/UL (ref 0.1–0.95)
MONOCYTES NFR BLD: 9 % (ref 2–12)
NEUTROPHILS NFR BLD: 78 % (ref 43–80)
NEUTS SEG NFR BLD: 6.86 K/UL (ref 1.8–7.3)
PLATELET # BLD AUTO: 153 K/UL (ref 130–450)
PMV BLD AUTO: 10.6 FL (ref 7–12)
POTASSIUM SERPL-SCNC: 3.8 MMOL/L (ref 3.5–5)
PROT SERPL-MCNC: 6.9 G/DL (ref 6.4–8.3)
RBC # BLD AUTO: 4.26 M/UL (ref 3.8–5.8)
SODIUM SERPL-SCNC: 134 MMOL/L (ref 132–146)
WBC OTHER # BLD: 8.9 K/UL (ref 4.5–11.5)

## 2024-11-26 PROCEDURE — 2500000003 HC RX 250 WO HCPCS: Performed by: EMERGENCY MEDICINE

## 2024-11-26 PROCEDURE — 6370000000 HC RX 637 (ALT 250 FOR IP): Performed by: REGISTERED NURSE

## 2024-11-26 PROCEDURE — 93005 ELECTROCARDIOGRAM TRACING: CPT | Performed by: INTERNAL MEDICINE

## 2024-11-26 PROCEDURE — 2580000003 HC RX 258: Performed by: INTERNAL MEDICINE

## 2024-11-26 PROCEDURE — 99232 SBSQ HOSP IP/OBS MODERATE 35: CPT | Performed by: INTERNAL MEDICINE

## 2024-11-26 PROCEDURE — 6370000000 HC RX 637 (ALT 250 FOR IP): Performed by: INTERNAL MEDICINE

## 2024-11-26 PROCEDURE — 93923 UPR/LXTR ART STDY 3+ LVLS: CPT

## 2024-11-26 PROCEDURE — 36415 COLL VENOUS BLD VENIPUNCTURE: CPT

## 2024-11-26 PROCEDURE — 2060000000 HC ICU INTERMEDIATE R&B

## 2024-11-26 PROCEDURE — 85025 COMPLETE CBC W/AUTO DIFF WBC: CPT

## 2024-11-26 PROCEDURE — 2580000003 HC RX 258: Performed by: EMERGENCY MEDICINE

## 2024-11-26 PROCEDURE — 80053 COMPREHEN METABOLIC PANEL: CPT

## 2024-11-26 PROCEDURE — 6370000000 HC RX 637 (ALT 250 FOR IP): Performed by: NURSE PRACTITIONER

## 2024-11-26 PROCEDURE — 99223 1ST HOSP IP/OBS HIGH 75: CPT | Performed by: INTERNAL MEDICINE

## 2024-11-26 PROCEDURE — 93010 ELECTROCARDIOGRAM REPORT: CPT | Performed by: INTERNAL MEDICINE

## 2024-11-26 RX ORDER — CEPHALEXIN 500 MG/1
1000 CAPSULE ORAL EVERY 8 HOURS SCHEDULED
Status: DISCONTINUED | OUTPATIENT
Start: 2024-11-26 | End: 2024-11-27 | Stop reason: HOSPADM

## 2024-11-26 RX ADMIN — METOPROLOL SUCCINATE 100 MG: 100 TABLET, EXTENDED RELEASE ORAL at 07:59

## 2024-11-26 RX ADMIN — METOPROLOL SUCCINATE 75 MG: 50 TABLET, EXTENDED RELEASE ORAL at 20:17

## 2024-11-26 RX ADMIN — CEPHALEXIN 1000 MG: 500 CAPSULE ORAL at 14:28

## 2024-11-26 RX ADMIN — CALCIUM CARBONATE 500 MG: 500 TABLET, CHEWABLE ORAL at 20:17

## 2024-11-26 RX ADMIN — SODIUM CHLORIDE, PRESERVATIVE FREE 10 ML: 5 INJECTION INTRAVENOUS at 08:00

## 2024-11-26 RX ADMIN — DILTIAZEM HYDROCHLORIDE 60 MG: 30 TABLET ORAL at 07:59

## 2024-11-26 RX ADMIN — ACETAMINOPHEN 650 MG: 325 TABLET ORAL at 14:34

## 2024-11-26 RX ADMIN — CEPHALEXIN 1000 MG: 500 CAPSULE ORAL at 20:17

## 2024-11-26 RX ADMIN — ACETAMINOPHEN 650 MG: 325 TABLET ORAL at 20:17

## 2024-11-26 RX ADMIN — SODIUM CHLORIDE 5 MG/HR: 900 INJECTION, SOLUTION INTRAVENOUS at 17:38

## 2024-11-26 NOTE — CONSULTS
Vascular Surgery Inpatient Consultation Note      Reason for Consultation: PVD, R 5th toe wound     HISTORY OF PRESENT ILLNESS:                The patient is a 89 y.o. male who is admitted to the hospital for treatment of right 5th toe wound. The patient states that he bumped his foot on his bed about 2 weeks ago. He states that since then the toe wound has worsened and become more painful. He presented to the ED and was found to have a toe fracture and cellulitis. He was started on abx. He had arterial studies that reported GALINA 0.7 on the right and 1.2 on the left with diminished waveforms over the digits on the right foot. Podiatry is planning right fifts celso amp vs partial ray resection.  Vascular surgery is consulted for evaluation and treatment of PVD and revascularization prior to podiatric intervention.   The patient states that he has never had a nonhealing wound before. He has never smoked. He is not a diabetic. He lives at home and ambulates with a cane.     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 He understands that the goal is to heal whatever intervention is deemed necessary for the toe by podiatry. The procedure including risks, benefits, and alternative options were discussed. The patient states he would like to talk to his daughter more about the options tonight. He is leaning towards proceeding. I did let him know that I will tentatively put him on the schedule for the angiogram tomorrow downtown. He will let us know if he decides again intervention and we can then cancel the procedure. Continue to hold eliquis. NPO at midnight.     Plan reviewed with Dr. Del Toro.    Patient Active Problem List   Diagnosis Code    Essential hypertension I10    CKD (chronic kidney disease) stage 3, GFR 30-59 ml/min ([facility]) N18.30    Thrombocytopenia ([facility]) D69.6    Diabetes mellitus type 2, noninsulin  11/26/2024    CREATININE 1.2 11/26/2024       RADIOLOGY:  All pertinent imaging reviewed     Approximately spent 75 minutes treating this patient including  reviewing the laboratory, imaging, and clinical findings electronic documentation.  I have discussed the clinical implications and recommendations. More than 50% of time was spent counseling patient. The patient verbalized understanding.

## 2024-11-26 NOTE — PROGRESS NOTES
4 Eyes Skin Assessment     NAME:  Marcello Edwards  YOB: 1930  MEDICAL RECORD NUMBER:  [id number]    The patient is being assessed for  Admission    I agree that at least one RN has performed a thorough Head to Toe Skin Assessment on the patient. ALL assessment sites listed below have been assessed.      Areas assessed by both nurses:    Head, Face, Ears, Shoulders, Back, Chest, Arms, Elbows, Hands, Sacrum. Buttock, Coccyx, Ischium, Legs. Feet and Heels, and Under Medical Devices         Does the Patient have a Wound? Yes wound(s) were present on assessment. LDA wound assessment was Initiated and completed by RN       Franko Prevention initiated by RN: No  Wound Care Orders initiated by RN: No    Pressure Injury (Stage 3,4, Unstageable, DTI, NWPT, and Complex wounds) if present, place Wound referral order by RN under : No    New Ostomies, if present place, Ostomy referral order under : No     Nurse 1 eSignature: Electronically signed by Brandi Dee RN on 11/26/24 at 12:21 AM EST    **SHARE this note so that the co-signing nurse can place an eSignature**    Nurse 2 eSignature: Electronically signed by Aubrie Doss RN on 11/26/24 at 1:36 AM EST

## 2024-11-26 NOTE — CONSULTS
[facility]  [facility]  Consultation Note     Admit Date: 11/25/2024 12:33 PM    Reason for Consult:   right 5th toe cellulitis     Attending Physician:  Emily Nascimento MD    HISTORY OF PRESENT ILLNESS:             The history is obtained from extensive review of available past medical records. The patient is a 89 y.o. male who is not previously known to the ID service.    He presented to the ED on 11/25 with a wound to his right 5th toe. He bumped it about 2 weeks ago and it has been open and bleeding since. He was taking amoxicillin that he had left over at home as well as putting antibiotic ointment on the toe. He denies any fevers or chills. He had no other complaints aside from the toe wound. There is a necrotic wound over the 5th toe with bleeding. There is a small amount of cellulitis on the base of the toe/dorsum of the foot.     Labs in the ED showed WBC 12.6, down to 8.9, CRP 47, ESR 22. Xray shows a fracture of the proximal 5th metatarsal. He was seen by Podiatry. He was given a dose of ceftriaxone and a dose of vacnomycin in the ED. Cultures were not taken. ID was asked to see in consulation.    Past Medical History:        Diagnosis Date    A-fib (HCC)     Hx of colonoscopy 10/01/2010    Dr.Sayed Mcnally-NEGATIVE for polyps    Hypertension     Paget's disease of bone     evaluated by endocrine in [facility]    Urinary retention with incomplete bladder emptying     self caths     Past Surgical History:        Procedure Laterality Date    BLADDER STONE REMOVAL       -cystoscopy    CATARACT REMOVAL WITH IMPLANT Bilateral     local opthalmology    HERNIA REPAIR      TOTAL KNEE ARTHROPLASTY Left     approx. 2018     Current Medications:   Scheduled Meds:   [Held by provider] apixaban  2.5 mg Oral BID    dilTIAZem  60 mg Oral Daily with breakfast    metoprolol succinate  100 mg Oral Daily    sodium chloride flush  5-40 mL IntraVENous 2 times per day     Continuous  Infusions:   sodium chloride      dilTIAZem 10 mg/hr (11/26/24 0801)     PRN Meds:sodium chloride flush, sodium chloride, ondansetron **OR** ondansetron, polyethylene glycol, acetaminophen **OR** acetaminophen, calcium carbonate    Allergies:  Patient has no known allergies.    Social History:   Social History     Socioeconomic History    Marital status:    Tobacco Use    Smoking status: Never    Smokeless tobacco: Never   Vaping Use    Vaping status: Never Used   Substance and Sexual Activity    Alcohol use: No    Drug use: No     Social Determinants of Health     Food Insecurity: No Food Insecurity (11/25/2024)    Hunger Vital Sign     Worried About Running Out of Food in the Last Year: Never true     Ran Out of Food in the Last Year: Never true   Transportation Needs: No Transportation Needs (11/25/2024)    PRAPARE - Transportation     Lack of Transportation (Medical): No     Lack of Transportation (Non-Medical): No   Housing Stability: Low Risk  (11/25/2024)    Housing Stability Vital Sign     Unable to Pay for Housing in the Last Year: No     Number of Times Moved in the Last Year: 0     Homeless in the Last Year: No      Pets: none  Travel: none  He lives at home alone.    Family History:       Problem Relation Age of Onset    Other Mother     Cancer Father    . Otherwise non-pertinent to the chief complaint.    REVIEW OF SYSTEMS:    Constitutional: negative for fevers, chills, diaphoresis  Neurologic: Negative   Psychiatric: Negative  Rheumatologic: Negative   Endocrine: Negative  Hematologic: Negative  Immunologic: negative  ENT: Negative  Respiratory: Negative   Cardiovascular: Negative  GI: Negative  : Negative  Musculoskeletal: Negative  Skin: No rashes.     PHYSICAL EXAM:    Vitals:   /67   Pulse 96   Temp 97.3 °F (36.3 °C) (Oral)   Resp 20   Ht 1.803 m (5' 11\")   Wt 80.7 kg (178 lb)   SpO2 96%   BMI 24.83 kg/m²   Constitutional: The patient is awake, alert, and oriented. Lying in

## 2024-11-26 NOTE — PROGRESS NOTES
Dr. Nascimento notified of current HR, cardizem gtt, and order for p.o. cardizem. Awaiting clarification.

## 2024-11-26 NOTE — CARE COORDINATION
Introduced my self and provided explanation of CM role to patient. Admitted for cellulitis of right foot. Cardiology, ID and Podiatry are following. Vascular study results pending. Vascular Surgery consult pending, await plan. Currently on cardizem gtt, PO keflex. Eliquis on hold. Awaiting ECHO. He voices he resides alone in a 1 story home with one step to enter. Patient ambulates with a cane, that is present in patients room. He completes his adl's with independence. Patient is established with Dr. Cutler [facility] [place], patient self catheterizes at home and [facility]. CM received mesg to call [facility] with [facility], [phone] ext. [phone], all questions answered. Patient anticipated to discharge home with no needs identified at this time, will continue to follow.  Zoey NGON, RN  Case Management

## 2024-11-26 NOTE — PLAN OF CARE
Problem: ABCDS Injury Assessment  Goal: Absence of physical injury  11/26/2024 1025 by Barbara Styles, RN  Outcome: Progressing  11/25/2024 2203 by Brandi Dee RN  Outcome: Progressing     Problem: Chronic Conditions and Co-morbidities  Goal: Patient's chronic conditions and co-morbidity symptoms are monitored and maintained or improved  11/26/2024 1025 by Barbara Styles, RN  Outcome: Progressing  Flowsheets (Taken 11/26/2024 0755)  Care Plan - Patient's Chronic Conditions and Co-Morbidity Symptoms are Monitored and Maintained or Improved:   Monitor and assess patient's chronic conditions and comorbid symptoms for stability, deterioration, or improvement   Collaborate with multidisciplinary team to address chronic and comorbid conditions and prevent exacerbation or deterioration   Update acute care plan with appropriate goals if chronic or comorbid symptoms are exacerbated and prevent overall improvement and discharge  11/25/2024 2203 by Brandi Dee RN  Outcome: Progressing     Problem: Discharge Planning  Goal: Discharge to home or other facility with appropriate resources  11/26/2024 1025 by Barbara Styles RN  Outcome: Progressing  Flowsheets (Taken 11/26/2024 0755)  Discharge to home or other facility with appropriate resources:   Identify barriers to discharge with patient and caregiver   Arrange for needed discharge resources and transportation as appropriate   Identify discharge learning needs (meds, wound care, etc)   Arrange for interpreters to assist at discharge as needed   Refer to discharge planning if patient needs post-hospital services based on physician order or complex needs related to functional status, cognitive ability or social support system  11/25/2024 2203 by Brandi Dee RN  Outcome: Progressing     Problem: Pain  Goal: Verbalizes/displays adequate comfort level or baseline comfort level  11/26/2024 1025 by Barbara Styles, RN  Outcome: Progressing  Flowsheets (Taken 11/26/2024  0715)  Verbalizes/displays adequate comfort level or baseline comfort level:   Encourage patient to monitor pain and request assistance   Assess pain using appropriate pain scale   Administer analgesics based on type and severity of pain and evaluate response   Implement non-pharmacological measures as appropriate and evaluate response   Consider cultural and social influences on pain and pain management   Notify Licensed Independent Practitioner if interventions unsuccessful or patient reports new pain  11/25/2024 2203 by Brandi Dee RN  Outcome: Progressing     Problem: Safety - Adult  Goal: Free from fall injury  Outcome: Progressing

## 2024-11-26 NOTE — PROGRESS NOTES
[facility] Hospitalist Progress Note    Admitting Date and Time: 11/25/2024 12:33 PM  Admit Dx: Cellulitis of fifth toe of right foot [L03.031]  Cellulitis of right foot [L03.115]  Displaced fracture of fifth metatarsal bone, right foot, initial encounter for closed fracture [S92.351A]    Subjective:  Patient is being followed for Cellulitis of fifth toe of right foot [L03.031]  Cellulitis of right foot [L03.115]  Displaced fracture of fifth metatarsal bone, right foot, initial encounter for closed fracture [S92.351A]   Pt seen examined this morning  No acute events overnight  No acute distress    ROS: denies fever, chills, cp, sob, n/v, HA unless stated above.      [Held by provider] apixaban  5 mg Oral BID    metoprolol succinate  75 mg Oral BID    sodium chloride flush  5-40 mL IntraVENous 2 times per day     sodium chloride flush, 5-40 mL, PRN  sodium chloride, , PRN  ondansetron, 4 mg, Q8H PRN   Or  ondansetron, 4 mg, Q6H PRN  polyethylene glycol, 17 g, Daily PRN  acetaminophen, 650 mg, Q6H PRN   Or  acetaminophen, 650 mg, Q6H PRN  calcium carbonate, 500 mg, TID PRN         Objective:    /64   Pulse 68   Temp 98.2 °F (36.8 °C) (Oral)   Resp 18   Ht 1.803 m (5' 11\")   Wt 80.7 kg (178 lb)   SpO2 97%   BMI 24.83 kg/m²     General Appearance: alert and oriented to person, place and time and in no acute distress  Skin: warm and dry  Head: normocephalic and atraumatic  Eyes: pupils equal, round, and reactive to light, extraocular eye movements intact, conjunctivae normal  Neck: neck supple and non tender without mass   Pulmonary/Chest: clear to auscultation bilaterally- no wheezes, rales or rhonchi, normal air movement, no respiratory distress  Cardiovascular: normal rate, normal S1 and S2 and no carotid bruits  Abdomen: soft, non-tender, non-distended, normal bowel sounds, no masses or organomegaly  Extremities: no cyanosis, no clubbing and no edema.  Necrotic wound at right fifth  toe  Neurologic: no cranial nerve deficit and speech normal        Recent Labs     11/25/24  1312 11/26/24  0348    134   K 4.5 3.8   CL 98 102   CO2 24 21*   BUN 21 18   CREATININE 1.2 1.2   GLUCOSE 295* 205*   CALCIUM 9.5 9.1       Recent Labs     11/25/24  1312 11/26/24  0348   WBC 12.6* 8.9   RBC 4.55 4.26   HGB 13.9 13.2   HCT 43.6 39.2   MCV 95.8 92.0   MCH 30.5 31.0   MCHC 31.9* 33.7   RDW 13.3 13.4    153   MPV 9.9 10.6         Assessment and Plan:     Principal Problem:    Cellulitis of right foot  Active Problems:    Cellulitis of fifth toe of right foot    Urinary retention    Primary hypertension    Atrial fibrillation (HCC)  Resolved Problems:    * No resolved hospital problems. *    Right fifth toe necrotic wound.  Podiatry and ID following.  Vascular studies pending.  Hold off antibiotics at this time.  Surgical intervention per podiatry  A-fib RVR.  Was on Cardizem drip.  Turned off today.  Appreciate cardiology's input  Leukocytosis.  2/2 #1.  Resolved  Urinary retention.  Self caths at home      NOTE: This report was transcribed using voice recognition software. Every effort was made to ensure accuracy; however, inadvertent computerized transcription errors may be present.  Electronically signed by Emily Nasicmento MD on 11/26/2024 at 12:39 PM

## 2024-11-26 NOTE — PROGRESS NOTES
Upon entering patient's room, found him with home pill bottles. Patient had eliquis laid out on his bedside table and he stated \"I was just about to take my eliquis.\" This RN re-educated patient that he should NOT be taking any medications not supplied by the hospital at this time. Also explained that eliquis is on a provider hold and should not be taken at all right now until restarted by the physician. Patient verbalized understanding. Attempted to lock home meds in med room, but patient refused for this RN to move them. Patient states someone will be coming to visit shortly and can take them home. Re-iterated that pt is not to take any medications not given by staff, understanding verbalized.

## 2024-11-26 NOTE — PROGRESS NOTES
Patient requesting TUMS at this time, [employer] message sent to ROSITA Rayo.  New orders received, see MAR    Patient voiced to this RN that he had a half of Tylenol PM  in his bag that he took around 2200 \"to help him sleep\". This RN provided education to patient that he cannot take any meds that we do not supply to him here at the hospital. Patient voiced understanding.

## 2024-11-26 NOTE — PROGRESS NOTES
Consult completed to Infectious Disease via phone call, per office Dr. Coe will see patient    Elena Mijares - unit secretary

## 2024-11-26 NOTE — PROGRESS NOTES
Spiritual Health History and Assessment/Progress Note  [facility]    Initial Encounter, Attempted Encounter,  ,  ,      Name: Marcello Edwards MRN: [id number]    Age: 89 y.o.     Sex: male   Language: English   Christian: None   Cellulitis of right foot     Date: 11/26/2024                           Spiritual Assessment began in [address] MED SURG        Referral/Consult From: Rounding   Encounter Overview/Reason: Initial Encounter, Attempted Encounter  Service Provided For: Patient    Lilly, Belief, Meaning:   Patient unable to assess at this time  Family/Friends No family/friends present      Importance and Influence:  Patient unable to assess at this time  Family/Friends No family/friends present    Community:  Patient feels well-supported. Support system includes: Children  Family/Friends No family/friends present    Assessment and Plan of Care:     Patient Interventions include: Other: Patient's door is closed and room darkened. Observed the patient lying in bed at rest and appearing calm, did not disturb. Silent prayer was said for the patient  Family/Friends Interventions include: No family/friends present    Patient Plan of Care: Spiritual Care available upon further referral  Family/Friends Plan of Care: No family/friends present    Electronically signed by Chaplain Bret on 11/26/2024 at 4:24 PM

## 2024-11-26 NOTE — ACP (ADVANCE CARE PLANNING)
Advance Care Planning   Healthcare Decision Maker:    Primary Decision Maker: Brionna Hamlin - [phone]    Click here to complete Healthcare Decision Makers including selection of the Healthcare Decision Maker Relationship (ie \"Primary\").  Today we documented Decision Maker(s) consistent with Legal Next of Kin hierarchy.

## 2024-11-26 NOTE — PROGRESS NOTES
Transport arranged with [facility] for transport to [facility] 11/27/24 with ETA 8am for 9am appointment. Bedside RN made aware.

## 2024-11-26 NOTE — H&P (VIEW-ONLY)
Vascular Surgery Inpatient Consultation Note      Reason for Consultation: PVD, R 5th toe wound     HISTORY OF PRESENT ILLNESS:                The patient is a 89 y.o. male who is admitted to the hospital for treatment of right 5th toe wound. The patient states that he bumped his foot on his bed about 2 weeks ago. He states that since then the toe wound has worsened and become more painful. He presented to the ED and was found to have a toe fracture and cellulitis. He was started on abx. He had arterial studies that reported GALINA 0.7 on the right and 1.2 on the left with diminished waveforms over the digits on the right foot. Podiatry is planning right fifts celso amp vs partial ray resection.  Vascular surgery is consulted for evaluation and treatment of PVD and revascularization prior to podiatric intervention.   The patient states that he has never had a nonhealing wound before. He has never smoked. He is not a diabetic. He lives at home and ambulates with a cane.     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 He understands that the goal is to heal whatever intervention is deemed necessary for the toe by podiatry. The procedure including risks, benefits, and alternative options were discussed. The patient states he would like to talk to his daughter more about the options tonight. He is leaning towards proceeding. I did let him know that I will tentatively put him on the schedule for the angiogram tomorrow downtown. He will let us know if he decides again intervention and we can then cancel the procedure. Continue to hold eliquis. NPO at midnight.     Plan reviewed with Dr. Del Toro.    Patient Active Problem List   Diagnosis Code    Essential hypertension I10    CKD (chronic kidney disease) stage 3, GFR 30-59 ml/min ([facility]) N18.30    Thrombocytopenia ([facility]) D69.6    Diabetes mellitus type 2, noninsulin

## 2024-11-26 NOTE — PLAN OF CARE
Problem: ABCDS Injury Assessment  Goal: Absence of physical injury  Outcome: Progressing     Problem: Chronic Conditions and Co-morbidities  Goal: Patient's chronic conditions and co-morbidity symptoms are monitored and maintained or improved  Outcome: Progressing     Problem: Discharge Planning  Goal: Discharge to home or other facility with appropriate resources  Outcome: Progressing     Problem: Pain  Goal: Verbalizes/displays adequate comfort level or baseline comfort level  Outcome: Progressing

## 2024-11-26 NOTE — PROGRESS NOTES
Department of Podiatry  Progress Note    SUBJECTIVE:  Patient seen bedside for right 5th digit wound. Patient doing well today. He states that he is not having any pain to the toe but does state he has pain in the foot where the fracture is located. Patient requesting nails to be trimmed this morning. Still awaiting vascular studies but did discuss probable amputation of toe with patient. No acute events overnight. Patient denies any N/V/D/F/C/SOB/CP and has no other pedal complaints at this time.     OBJECTIVE:    Scheduled Meds:   [Held by provider] apixaban  2.5 mg Oral BID    dilTIAZem  60 mg Oral Daily with breakfast    metoprolol succinate  100 mg Oral Daily    sodium chloride flush  5-40 mL IntraVENous 2 times per day     Continuous Infusions:   sodium chloride      dilTIAZem 10 mg/hr (11/26/24 0801)     PRN Meds:.sodium chloride flush, sodium chloride, ondansetron **OR** ondansetron, polyethylene glycol, acetaminophen **OR** acetaminophen, calcium carbonate    No Known Allergies    /67   Pulse 96   Temp 97.3 °F (36.3 °C) (Oral)   Resp 20   Ht 1.803 m (5' 11\")   Wt 80.7 kg (178 lb)   SpO2 96%   BMI 24.83 kg/m²       EXAM:  Dressing intact, previous physical exam  VASCULAR:  DP and PT pulses are palpable. CFT < 5 seconds B/L.  Warm to warm from the tibial tuberosity to the distal aspect of the digits dorsally. Hair growth absent to the distal aspects dorsally.     NEUROLOGIC:  Protective sensation is diminished by grossly intact     DERM:  Right: wound noted to dorsal 5th digit with scabbing noted to surrounding periwound area. Areas of necrosis noted to central area of wound. No drainage to the wound or malodor present. Dried blood present in 4th interdigital space. Erythema present to 5th digit and proximally along dorsolateral asepct of foot.              Left: no open wounds or clinical signs of infection noted.               Nails are elongated and thickened B/L.     MUSCULOSKELETAL: No

## 2024-11-26 NOTE — CONSULTS
and no peripheral edema is present. No focal neurologic deficits are present.    Intake/Output:    Intake/Output Summary (Last 24 hours) at 11/26/2024 1237  Last data filed at 11/26/2024 0801  Gross per 24 hour   Intake 809.17 ml   Output --   Net 809.17 ml     I/O this shift:  In: 809.2 [P.O.:240; I.V.:85; IV Piggyback:484.2]  Out: -     Laboratory Tests:  Lab Results   Component Value Date    CREATININE 1.2 11/26/2024    BUN 18 11/26/2024     11/26/2024    K 3.8 11/26/2024     11/26/2024    CO2 21 (L) 11/26/2024     No results for input(s): \"CKTOTAL\", \"CKMB\" in the last 72 hours.    Invalid input(s): \"TROPONONI\"  No results found for: \"BNP\"  Lab Results   Component Value Date/Time    WBC 8.9 11/26/2024 03:48 AM    RBC 4.26 11/26/2024 03:48 AM    HGB 13.2 11/26/2024 03:48 AM    HCT 39.2 11/26/2024 03:48 AM    MCV 92.0 11/26/2024 03:48 AM    MCH 31.0 11/26/2024 03:48 AM    MCHC 33.7 11/26/2024 03:48 AM    RDW 13.4 11/26/2024 03:48 AM     11/26/2024 03:48 AM    MPV 10.6 11/26/2024 03:48 AM     Recent Labs     11/26/24  0348   ALKPHOS 116   ALT 29   AST 32   BILITOT 0.7     Lab Results   Component Value Date/Time    MG 1.7 05/25/2023 05:54 AM     No results found for: \"PROTIME\", \"INR\"  Lab Results   Component Value Date/Time    TSH 3.58 05/23/2023 05:26 AM     No components found for: \"CHLPL\"  Lab Results   Component Value Date    TRIG 56 05/23/2023     Lab Results   Component Value Date    HDL 29 05/23/2023     No components found for: \"LDLCALC\"    Cardiac Tests:  ECG: A resting electrocardiogram reviewed at the time of evaluation atrial flutter with a mean ventricular response of approximately 75 bpm with left axis deviation, and intraventricular conduction delay and delayed precordial transition  Telemetry findings reviewed: atrial flutter presently with mean rates of approximately 100 bpm and maximum rates of 140 bpm, no new tachy/bradyarrhythmias overnight  Chest X-ray: none obtained  Last  future atherosclerotic development.  Additional management of noncardiovascular issues will be deferred to primary care in addition to those additional involved consultants.      Note: This report was completed utilizing computer voice recognition software. Every effort has been made to ensure accuracy, however; inadvertent computerized transcription errors may be present.    Srinivas Rojas MD  [facility]

## 2024-11-26 NOTE — PROGRESS NOTES
HR 60s-70s. Entered room to wean cardizem and found drip had been turned off. Patient unsure who turned pump off.

## 2024-11-27 ENCOUNTER — HOSPITAL ENCOUNTER (OUTPATIENT)
Age: 89
Setting detail: OUTPATIENT SURGERY
Discharge: ANOTHER HOSPITAL | End: 2024-11-27
Attending: STUDENT IN AN ORGANIZED HEALTH CARE EDUCATION/TRAINING PROGRAM | Admitting: STUDENT IN AN ORGANIZED HEALTH CARE EDUCATION/TRAINING PROGRAM
Payer: OTHER GOVERNMENT

## 2024-11-27 ENCOUNTER — APPOINTMENT (OUTPATIENT)
Age: 89
DRG: 256 | End: 2024-11-27
Attending: INTERNAL MEDICINE
Payer: OTHER GOVERNMENT

## 2024-11-27 ENCOUNTER — HOSPITAL ENCOUNTER (INPATIENT)
Age: 89
LOS: 5 days | Discharge: HOME | DRG: 256 | End: 2024-12-02
Attending: INTERNAL MEDICINE | Admitting: INTERNAL MEDICINE
Payer: OTHER GOVERNMENT

## 2024-11-27 VITALS
BODY MASS INDEX: 24.92 KG/M2 | DIASTOLIC BLOOD PRESSURE: 89 MMHG | WEIGHT: 178 LBS | TEMPERATURE: 97.9 F | HEIGHT: 71 IN | SYSTOLIC BLOOD PRESSURE: 139 MMHG | OXYGEN SATURATION: 95 % | RESPIRATION RATE: 18 BRPM | HEART RATE: 136 BPM

## 2024-11-27 VITALS
DIASTOLIC BLOOD PRESSURE: 78 MMHG | SYSTOLIC BLOOD PRESSURE: 130 MMHG | HEART RATE: 120 BPM | TEMPERATURE: 97.5 F | OXYGEN SATURATION: 96 % | RESPIRATION RATE: 19 BRPM

## 2024-11-27 DIAGNOSIS — I73.9 PERIPHERAL VASCULAR DISEASE, UNSPECIFIED (HCC): ICD-10-CM

## 2024-11-27 DIAGNOSIS — L03.031 CELLULITIS OF FIFTH TOE OF RIGHT FOOT: ICD-10-CM

## 2024-11-27 LAB
ALBUMIN SERPL-MCNC: 3.3 G/DL (ref 3.5–5.2)
ALP SERPL-CCNC: 109 U/L (ref 40–129)
ALT SERPL-CCNC: 32 U/L (ref 0–40)
ANION GAP SERPL CALCULATED.3IONS-SCNC: 11 MMOL/L (ref 7–16)
AST SERPL-CCNC: 32 U/L (ref 0–39)
BASOPHILS # BLD: 0.04 K/UL (ref 0–0.2)
BASOPHILS NFR BLD: 1 % (ref 0–2)
BILIRUB SERPL-MCNC: 0.7 MG/DL (ref 0–1.2)
BUN SERPL-MCNC: 16 MG/DL (ref 6–23)
CALCIUM SERPL-MCNC: 8.7 MG/DL (ref 8.6–10.2)
CHLORIDE SERPL-SCNC: 103 MMOL/L (ref 98–107)
CO2 SERPL-SCNC: 21 MMOL/L (ref 22–29)
CREAT SERPL-MCNC: 1 MG/DL (ref 0.7–1.2)
ECHO BSA: 2.01 M2
ECHO LA DIAMETER INDEX: 1.99 CM/M2
ECHO LA DIAMETER: 4 CM
ECHO LV EDV A2C: 49 ML
ECHO LV EDV A4C: 44 ML
ECHO LV EDV BP: 48 ML (ref 67–155)
ECHO LV EDV INDEX A4C: 22 ML/M2
ECHO LV EDV INDEX BP: 24 ML/M2
ECHO LV EDV NDEX A2C: 24 ML/M2
ECHO LV EF PHYSICIAN: 60 %
ECHO LV EJECTION FRACTION A2C: 54 %
ECHO LV EJECTION FRACTION A4C: 59 %
ECHO LV EJECTION FRACTION BIPLANE: 56 % (ref 55–100)
ECHO LV ESV A2C: 22 ML
ECHO LV ESV A4C: 18 ML
ECHO LV ESV BP: 21 ML (ref 22–58)
ECHO LV ESV INDEX A2C: 11 ML/M2
ECHO LV ESV INDEX A4C: 9 ML/M2
ECHO LV ESV INDEX BP: 10 ML/M2
ECHO LV FRACTIONAL SHORTENING: 26 % (ref 28–44)
ECHO LV INTERNAL DIMENSION DIASTOLE INDEX: 1.94 CM/M2
ECHO LV INTERNAL DIMENSION DIASTOLIC: 3.9 CM (ref 4.2–5.9)
ECHO LV INTERNAL DIMENSION SYSTOLIC INDEX: 1.44 CM/M2
ECHO LV INTERNAL DIMENSION SYSTOLIC: 2.9 CM
ECHO LV IVSD: 1 CM (ref 0.6–1)
ECHO LV IVSS: 1.3 CM
ECHO LV MASS 2D: 97.4 G (ref 88–224)
ECHO LV MASS INDEX 2D: 48.4 G/M2 (ref 49–115)
ECHO LV POSTERIOR WALL DIASTOLIC: 0.7 CM (ref 0.6–1)
ECHO LV POSTERIOR WALL SYSTOLIC: 0.9 CM
ECHO LV RELATIVE WALL THICKNESS RATIO: 0.36
ECHO RV INTERNAL DIMENSION: 2.5 CM
ECHO RV TAPSE: 1.8 CM (ref 1.7–?)
EOSINOPHIL # BLD: 0.14 K/UL (ref 0.05–0.5)
EOSINOPHILS RELATIVE PERCENT: 2 % (ref 0–6)
ERYTHROCYTE [DISTWIDTH] IN BLOOD BY AUTOMATED COUNT: 13.3 % (ref 11.5–15)
GFR, ESTIMATED: 67 ML/MIN/1.73M2
GLUCOSE SERPL-MCNC: 169 MG/DL (ref 74–99)
HCT VFR BLD AUTO: 37.9 % (ref 37–54)
HGB BLD-MCNC: 12.3 G/DL (ref 12.5–16.5)
IMM GRANULOCYTES # BLD AUTO: 0.04 K/UL (ref 0–0.58)
IMM GRANULOCYTES NFR BLD: 1 % (ref 0–5)
LYMPHOCYTES NFR BLD: 0.89 K/UL (ref 1.5–4)
LYMPHOCYTES RELATIVE PERCENT: 10 % (ref 20–42)
MAGNESIUM SERPL-MCNC: 2.2 MG/DL (ref 1.6–2.6)
MCH RBC QN AUTO: 30.5 PG (ref 26–35)
MCHC RBC AUTO-ENTMCNC: 32.5 G/DL (ref 32–34.5)
MCV RBC AUTO: 94 FL (ref 80–99.9)
MONOCYTES NFR BLD: 0.7 K/UL (ref 0.1–0.95)
MONOCYTES NFR BLD: 8 % (ref 2–12)
NEUTROPHILS NFR BLD: 79 % (ref 43–80)
NEUTS SEG NFR BLD: 6.98 K/UL (ref 1.8–7.3)
PLATELET # BLD AUTO: 145 K/UL (ref 130–450)
PMV BLD AUTO: 10.5 FL (ref 7–12)
POTASSIUM SERPL-SCNC: 3.5 MMOL/L (ref 3.5–5)
PROT SERPL-MCNC: 6.4 G/DL (ref 6.4–8.3)
RBC # BLD AUTO: 4.03 M/UL (ref 3.8–5.8)
SODIUM SERPL-SCNC: 135 MMOL/L (ref 132–146)
WBC OTHER # BLD: 8.8 K/UL (ref 4.5–11.5)

## 2024-11-27 PROCEDURE — 37232 HC TIB PER TERR ADDL PLASTY: CPT | Performed by: STUDENT IN AN ORGANIZED HEALTH CARE EDUCATION/TRAINING PROGRAM

## 2024-11-27 PROCEDURE — 0Y6X0Z0 DETACHMENT AT RIGHT 5TH TOE, COMPLETE, OPEN APPROACH: ICD-10-PCS | Performed by: PODIATRIST

## 2024-11-27 PROCEDURE — 75710 ARTERY X-RAYS ARM/LEG: CPT | Performed by: STUDENT IN AN ORGANIZED HEALTH CARE EDUCATION/TRAINING PROGRAM

## 2024-11-27 PROCEDURE — 99233 SBSQ HOSP IP/OBS HIGH 50: CPT | Performed by: INTERNAL MEDICINE

## 2024-11-27 PROCEDURE — 75716 ARTERY X-RAYS ARMS/LEGS: CPT | Performed by: STUDENT IN AN ORGANIZED HEALTH CARE EDUCATION/TRAINING PROGRAM

## 2024-11-27 PROCEDURE — 2580000003 HC RX 258: Performed by: EMERGENCY MEDICINE

## 2024-11-27 PROCEDURE — 2060000000 HC ICU INTERMEDIATE R&B

## 2024-11-27 PROCEDURE — 37228 HC TIB PER TERRITORY PLASTY: CPT | Performed by: STUDENT IN AN ORGANIZED HEALTH CARE EDUCATION/TRAINING PROGRAM

## 2024-11-27 PROCEDURE — 6360000002 HC RX W HCPCS: Performed by: INTERNAL MEDICINE

## 2024-11-27 PROCEDURE — 047M3Z1 DILATION OF RIGHT POPLITEAL ARTERY USING DRUG-COATED BALLOON, PERCUTANEOUS APPROACH: ICD-10-PCS | Performed by: INTERNAL MEDICINE

## 2024-11-27 PROCEDURE — C1760 CLOSURE DEV, VASC: HCPCS | Performed by: STUDENT IN AN ORGANIZED HEALTH CARE EDUCATION/TRAINING PROGRAM

## 2024-11-27 PROCEDURE — C1725 CATH, TRANSLUMIN NON-LASER: HCPCS | Performed by: STUDENT IN AN ORGANIZED HEALTH CARE EDUCATION/TRAINING PROGRAM

## 2024-11-27 PROCEDURE — 80053 COMPREHEN METABOLIC PANEL: CPT

## 2024-11-27 PROCEDURE — 93308 TTE F-UP OR LMTD: CPT

## 2024-11-27 PROCEDURE — 6360000004 HC RX CONTRAST MEDICATION: Performed by: STUDENT IN AN ORGANIZED HEALTH CARE EDUCATION/TRAINING PROGRAM

## 2024-11-27 PROCEDURE — C1769 GUIDE WIRE: HCPCS | Performed by: STUDENT IN AN ORGANIZED HEALTH CARE EDUCATION/TRAINING PROGRAM

## 2024-11-27 PROCEDURE — 2500000003 HC RX 250 WO HCPCS: Performed by: EMERGENCY MEDICINE

## 2024-11-27 PROCEDURE — 047P3ZZ DILATION OF RIGHT ANTERIOR TIBIAL ARTERY, PERCUTANEOUS APPROACH: ICD-10-PCS | Performed by: INTERNAL MEDICINE

## 2024-11-27 PROCEDURE — 85025 COMPLETE CBC W/AUTO DIFF WBC: CPT

## 2024-11-27 PROCEDURE — 6370000000 HC RX 637 (ALT 250 FOR IP): Performed by: INTERNAL MEDICINE

## 2024-11-27 PROCEDURE — 93325 DOPPLER ECHO COLOR FLOW MAPG: CPT | Performed by: INTERNAL MEDICINE

## 2024-11-27 PROCEDURE — 83735 ASSAY OF MAGNESIUM: CPT

## 2024-11-27 PROCEDURE — C1894 INTRO/SHEATH, NON-LASER: HCPCS | Performed by: STUDENT IN AN ORGANIZED HEALTH CARE EDUCATION/TRAINING PROGRAM

## 2024-11-27 PROCEDURE — 2500000003 HC RX 250 WO HCPCS: Performed by: INTERNAL MEDICINE

## 2024-11-27 PROCEDURE — 7100000010 HC PHASE II RECOVERY - FIRST 15 MIN: Performed by: STUDENT IN AN ORGANIZED HEALTH CARE EDUCATION/TRAINING PROGRAM

## 2024-11-27 PROCEDURE — C2623 CATH, TRANSLUMIN, DRUG-COAT: HCPCS | Performed by: STUDENT IN AN ORGANIZED HEALTH CARE EDUCATION/TRAINING PROGRAM

## 2024-11-27 PROCEDURE — 75625 CONTRAST EXAM ABDOMINL AORTA: CPT | Performed by: STUDENT IN AN ORGANIZED HEALTH CARE EDUCATION/TRAINING PROGRAM

## 2024-11-27 PROCEDURE — 2709999900 HC NON-CHARGEABLE SUPPLY: Performed by: STUDENT IN AN ORGANIZED HEALTH CARE EDUCATION/TRAINING PROGRAM

## 2024-11-27 PROCEDURE — 6370000000 HC RX 637 (ALT 250 FOR IP): Performed by: STUDENT IN AN ORGANIZED HEALTH CARE EDUCATION/TRAINING PROGRAM

## 2024-11-27 PROCEDURE — 36415 COLL VENOUS BLD VENIPUNCTURE: CPT

## 2024-11-27 PROCEDURE — 75774 ARTERY X-RAY EACH VESSEL: CPT | Performed by: STUDENT IN AN ORGANIZED HEALTH CARE EDUCATION/TRAINING PROGRAM

## 2024-11-27 PROCEDURE — 2580000003 HC RX 258: Performed by: INTERNAL MEDICINE

## 2024-11-27 PROCEDURE — 047T3ZZ DILATION OF RIGHT PERONEAL ARTERY, PERCUTANEOUS APPROACH: ICD-10-PCS | Performed by: INTERNAL MEDICINE

## 2024-11-27 PROCEDURE — 37224 HC FEM POP TERRITORY PLASTY: CPT | Performed by: STUDENT IN AN ORGANIZED HEALTH CARE EDUCATION/TRAINING PROGRAM

## 2024-11-27 PROCEDURE — 6360000002 HC RX W HCPCS: Performed by: STUDENT IN AN ORGANIZED HEALTH CARE EDUCATION/TRAINING PROGRAM

## 2024-11-27 PROCEDURE — 93308 TTE F-UP OR LMTD: CPT | Performed by: INTERNAL MEDICINE

## 2024-11-27 PROCEDURE — 6360000002 HC RX W HCPCS: Performed by: NURSE PRACTITIONER

## 2024-11-27 PROCEDURE — C1887 CATHETER, GUIDING: HCPCS | Performed by: STUDENT IN AN ORGANIZED HEALTH CARE EDUCATION/TRAINING PROGRAM

## 2024-11-27 PROCEDURE — 7100000011 HC PHASE II RECOVERY - ADDTL 15 MIN: Performed by: STUDENT IN AN ORGANIZED HEALTH CARE EDUCATION/TRAINING PROGRAM

## 2024-11-27 PROCEDURE — 047K3Z1 DILATION OF RIGHT FEMORAL ARTERY USING DRUG-COATED BALLOON, PERCUTANEOUS APPROACH: ICD-10-PCS | Performed by: INTERNAL MEDICINE

## 2024-11-27 PROCEDURE — 2580000003 HC RX 258: Performed by: NURSE PRACTITIONER

## 2024-11-27 RX ORDER — CLOPIDOGREL 300 MG/1
300 TABLET, FILM COATED ORAL ONCE
Status: DISCONTINUED | OUTPATIENT
Start: 2024-11-27 | End: 2024-11-27

## 2024-11-27 RX ORDER — IOPAMIDOL 612 MG/ML
INJECTION, SOLUTION INTRAVASCULAR PRN
Status: DISCONTINUED | OUTPATIENT
Start: 2024-11-27 | End: 2024-11-27 | Stop reason: HOSPADM

## 2024-11-27 RX ORDER — CLOPIDOGREL 300 MG/1
TABLET, FILM COATED ORAL PRN
Status: DISCONTINUED | OUTPATIENT
Start: 2024-11-27 | End: 2024-11-27 | Stop reason: HOSPADM

## 2024-11-27 RX ORDER — CALCIUM CARBONATE 500 MG/1
500 TABLET, CHEWABLE ORAL 3 TIMES DAILY PRN
Status: DISCONTINUED | OUTPATIENT
Start: 2024-11-27 | End: 2024-12-02 | Stop reason: HOSPADM

## 2024-11-27 RX ORDER — SODIUM CHLORIDE 0.9 % (FLUSH) 0.9 %
5-40 SYRINGE (ML) INJECTION EVERY 12 HOURS SCHEDULED
Status: DISCONTINUED | OUTPATIENT
Start: 2024-11-27 | End: 2024-12-02 | Stop reason: HOSPADM

## 2024-11-27 RX ORDER — ACETAMINOPHEN 650 MG/1
650 SUPPOSITORY RECTAL EVERY 6 HOURS PRN
Status: DISCONTINUED | OUTPATIENT
Start: 2024-11-27 | End: 2024-12-02 | Stop reason: HOSPADM

## 2024-11-27 RX ORDER — ONDANSETRON 2 MG/ML
4 INJECTION INTRAMUSCULAR; INTRAVENOUS EVERY 6 HOURS PRN
Status: DISCONTINUED | OUTPATIENT
Start: 2024-11-27 | End: 2024-12-01 | Stop reason: SDUPTHER

## 2024-11-27 RX ORDER — CEPHALEXIN 500 MG/1
1000 CAPSULE ORAL EVERY 8 HOURS SCHEDULED
Status: CANCELLED | OUTPATIENT
Start: 2024-11-27 | End: 2024-12-03

## 2024-11-27 RX ORDER — ONDANSETRON 2 MG/ML
4 INJECTION INTRAMUSCULAR; INTRAVENOUS EVERY 6 HOURS PRN
Status: CANCELLED | OUTPATIENT
Start: 2024-11-27

## 2024-11-27 RX ORDER — POLYETHYLENE GLYCOL 3350 17 G/17G
17 POWDER, FOR SOLUTION ORAL DAILY PRN
Status: CANCELLED | OUTPATIENT
Start: 2024-11-27

## 2024-11-27 RX ORDER — CLOPIDOGREL BISULFATE 75 MG/1
75 TABLET ORAL DAILY
Status: DISCONTINUED | OUTPATIENT
Start: 2024-11-28 | End: 2024-12-02 | Stop reason: HOSPADM

## 2024-11-27 RX ORDER — SODIUM CHLORIDE 9 MG/ML
INJECTION, SOLUTION INTRAVENOUS PRN
Status: DISCONTINUED | OUTPATIENT
Start: 2024-11-27 | End: 2024-12-02 | Stop reason: HOSPADM

## 2024-11-27 RX ORDER — ACETAMINOPHEN 325 MG/1
650 TABLET ORAL EVERY 6 HOURS PRN
Status: DISCONTINUED | OUTPATIENT
Start: 2024-11-27 | End: 2024-12-02 | Stop reason: HOSPADM

## 2024-11-27 RX ORDER — POLYETHYLENE GLYCOL 3350 17 G/17G
17 POWDER, FOR SOLUTION ORAL DAILY PRN
Status: DISCONTINUED | OUTPATIENT
Start: 2024-11-27 | End: 2024-12-02 | Stop reason: HOSPADM

## 2024-11-27 RX ORDER — SODIUM CHLORIDE 0.9 % (FLUSH) 0.9 %
5-40 SYRINGE (ML) INJECTION PRN
Status: DISCONTINUED | OUTPATIENT
Start: 2024-11-27 | End: 2024-12-02 | Stop reason: HOSPADM

## 2024-11-27 RX ORDER — CLOPIDOGREL BISULFATE 75 MG/1
75 TABLET ORAL DAILY
Status: DISCONTINUED | OUTPATIENT
Start: 2024-11-28 | End: 2024-11-27 | Stop reason: HOSPADM

## 2024-11-27 RX ORDER — HEPARIN SODIUM 1000 [USP'U]/ML
INJECTION, SOLUTION INTRAVENOUS; SUBCUTANEOUS PRN
Status: DISCONTINUED | OUTPATIENT
Start: 2024-11-27 | End: 2024-11-27 | Stop reason: HOSPADM

## 2024-11-27 RX ORDER — POTASSIUM CHLORIDE 1500 MG/1
40 TABLET, EXTENDED RELEASE ORAL
Status: DISCONTINUED | OUTPATIENT
Start: 2024-11-27 | End: 2024-11-27 | Stop reason: HOSPADM

## 2024-11-27 RX ORDER — MIDAZOLAM HYDROCHLORIDE 1 MG/ML
INJECTION, SOLUTION INTRAMUSCULAR; INTRAVENOUS PRN
Status: DISCONTINUED | OUTPATIENT
Start: 2024-11-27 | End: 2024-11-27 | Stop reason: HOSPADM

## 2024-11-27 RX ORDER — ACETAMINOPHEN 325 MG/1
650 TABLET ORAL EVERY 6 HOURS PRN
Status: CANCELLED | OUTPATIENT
Start: 2024-11-27

## 2024-11-27 RX ORDER — ONDANSETRON 4 MG/1
4 TABLET, ORALLY DISINTEGRATING ORAL EVERY 8 HOURS PRN
Status: DISCONTINUED | OUTPATIENT
Start: 2024-11-27 | End: 2024-12-01 | Stop reason: SDUPTHER

## 2024-11-27 RX ORDER — SODIUM CHLORIDE 9 MG/ML
INJECTION, SOLUTION INTRAVENOUS PRN
Status: CANCELLED | OUTPATIENT
Start: 2024-11-27

## 2024-11-27 RX ORDER — ACETAMINOPHEN 650 MG/1
650 SUPPOSITORY RECTAL EVERY 6 HOURS PRN
Status: CANCELLED | OUTPATIENT
Start: 2024-11-27

## 2024-11-27 RX ORDER — DIGOXIN 0.25 MG/ML
250 INJECTION INTRAMUSCULAR; INTRAVENOUS ONCE
Status: COMPLETED | OUTPATIENT
Start: 2024-11-27 | End: 2024-11-27

## 2024-11-27 RX ORDER — FENTANYL CITRATE 50 UG/ML
INJECTION, SOLUTION INTRAMUSCULAR; INTRAVENOUS PRN
Status: DISCONTINUED | OUTPATIENT
Start: 2024-11-27 | End: 2024-11-27 | Stop reason: HOSPADM

## 2024-11-27 RX ORDER — CALCIUM CARBONATE 500 MG/1
500 TABLET, CHEWABLE ORAL 3 TIMES DAILY PRN
Status: CANCELLED | OUTPATIENT
Start: 2024-11-27

## 2024-11-27 RX ORDER — ONDANSETRON 4 MG/1
4 TABLET, ORALLY DISINTEGRATING ORAL EVERY 8 HOURS PRN
Status: CANCELLED | OUTPATIENT
Start: 2024-11-27

## 2024-11-27 RX ORDER — SODIUM CHLORIDE 0.9 % (FLUSH) 0.9 %
5-40 SYRINGE (ML) INJECTION PRN
Status: CANCELLED | OUTPATIENT
Start: 2024-11-27

## 2024-11-27 RX ORDER — CEPHALEXIN 500 MG/1
1000 CAPSULE ORAL EVERY 8 HOURS SCHEDULED
Status: DISCONTINUED | OUTPATIENT
Start: 2024-11-27 | End: 2024-12-02 | Stop reason: HOSPADM

## 2024-11-27 RX ORDER — SODIUM CHLORIDE 0.9 % (FLUSH) 0.9 %
5-40 SYRINGE (ML) INJECTION EVERY 12 HOURS SCHEDULED
Status: CANCELLED | OUTPATIENT
Start: 2024-11-27

## 2024-11-27 RX ADMIN — SODIUM CHLORIDE 7.5 MG/HR: 900 INJECTION, SOLUTION INTRAVENOUS at 04:30

## 2024-11-27 RX ADMIN — CEFAZOLIN 2000 MG: 2 INJECTION, POWDER, FOR SOLUTION INTRAMUSCULAR; INTRAVENOUS at 12:15

## 2024-11-27 RX ADMIN — CEPHALEXIN 1000 MG: 500 CAPSULE ORAL at 22:35

## 2024-11-27 RX ADMIN — SODIUM CHLORIDE 5 MG/HR: 900 INJECTION, SOLUTION INTRAVENOUS at 19:14

## 2024-11-27 RX ADMIN — DIGOXIN 250 MCG: 0.25 INJECTION INTRAMUSCULAR; INTRAVENOUS at 08:10

## 2024-11-27 RX ADMIN — CALCIUM CARBONATE 500 MG: 500 TABLET, CHEWABLE ORAL at 22:35

## 2024-11-27 RX ADMIN — METOPROLOL SUCCINATE 75 MG: 50 TABLET, EXTENDED RELEASE ORAL at 20:01

## 2024-11-27 RX ADMIN — METOPROLOL SUCCINATE 75 MG: 50 TABLET, EXTENDED RELEASE ORAL at 08:11

## 2024-11-27 NOTE — OP NOTE
present and scrubbed for the entirety of the procedure.    Plan:  - 3 hours bedrest  - Plavix load 300 mg now, 75 mg daily  - Follow-up with me in 2 weeks for groin check  - Has been optimized from a vascular standpoint, okay to proceed with amputation by podiatry.  As a side note, perfusion to the toes is poor, would not be surprised if he requires a higher level amputation.  The pedal arch is not complete despite having the dorsal vessels and plantar vessels patent.    Electronically signed by Kasey Del Toro MD on 11/27/2024 at 1:49 PM

## 2024-11-27 NOTE — INTERVAL H&P NOTE
Update History & Physical    The patient's History and Physical of November 26, 2024 was reviewed with the patient and I examined the patient. There was no change. The surgical site was confirmed by the patient and me.     Plan: The risks, benefits, expected outcome, and alternative to the recommended procedure have been discussed with the patient. Patient understands and wants to proceed with the procedure.     Electronically signed by Kasey Del Toro MD on 11/27/2024 at 2:40 PM

## 2024-11-27 NOTE — POST SEDATION
Sedation Post Procedure Note    Patient Name: Marcello Edwards   YOB: 1930  Room/Bed: Cath Pool Room/  Medical Record Number: [id number]  Date: 11/27/2024   Time: 2:12 PM         Physicians/Assistants: Kasey Del Toro MD, MD    Procedure Performed:  angio, RLE SFA/pop/AT/peroneal angioplasty    Post-Sedation Vital Signs:  Vitals:    11/27/24 1409   BP:    Pulse: (!) 107   Resp: 17   Temp:    SpO2:       Vital signs were reviewed and were stable after the procedure (see flow sheet for vitals)            Post-Sedation Exam: irregular rate, CTAB, A&Ox3           Complications: none    Electronically signed by Kasey Del Toro MD on 11/27/2024 at 2:12 PM

## 2024-11-27 NOTE — PROGRESS NOTES
[facility] here for return to [facility].  [facility]. Updated report given to Mel HERNANDEZ at [facility]

## 2024-11-27 NOTE — PLAN OF CARE
Problem: Chronic Conditions and Co-morbidities  Goal: Patient's chronic conditions and co-morbidity symptoms are monitored and maintained or improved  Outcome: Progressing  Flowsheets (Taken 11/27/2024 0800)  Care Plan - Patient's Chronic Conditions and Co-Morbidity Symptoms are Monitored and Maintained or Improved: Monitor and assess patient's chronic conditions and comorbid symptoms for stability, deterioration, or improvement     Problem: Discharge Planning  Goal: Discharge to home or other facility with appropriate resources  Outcome: Progressing  Flowsheets (Taken 11/27/2024 0800)  Discharge to home or other facility with appropriate resources: Identify barriers to discharge with patient and caregiver     Problem: Safety - Adult  Goal: Free from fall injury  Outcome: Progressing  Flowsheets (Taken 11/27/2024 0800)  Free From Fall Injury: Instruct family/caregiver on patient safety

## 2024-11-27 NOTE — PLAN OF CARE
Problem: ABCDS Injury Assessment  Goal: Absence of physical injury  11/26/2024 2146 by Brandi Dee RN  Outcome: Progressing     Problem: Chronic Conditions and Co-morbidities  Goal: Patient's chronic conditions and co-morbidity symptoms are monitored and maintained or improved  11/26/2024 2146 by Brandi Dee RN  Outcome: Progressing     Problem: Discharge Planning  Goal: Discharge to home or other facility with appropriate resources  11/26/2024 2146 by Brandi Dee RN  Outcome: Progressing     Problem: Safety - Adult  Goal: Free from fall injury  11/26/2024 2146 by Brandi Dee RN  Outcome: Progressing

## 2024-11-27 NOTE — CARE COORDINATION
Plan to transfer to [facility] today for peripheral angiography. Cardiology, ID, Vascular Surgery, and Podiatry are following. Currently on cardizem gtt, PO keflex. Eliquis on hold. ECHO results pending. Podiatry plan for probable 5th toe amputation.  Will continue to follow.  Zoey NGON, RN  Case Management

## 2024-11-27 NOTE — PROGRESS NOTES
INPATIENT CARDIOLOGY FOLLOW-UP    Name: Marcello Edwards    Age: 89 y.o.    Date of Admission: 11/25/2024 12:33 PM    Date of Service: 11/27/2024    Chief Complaint: Follow-up for persistent atrial flutter, cardiomyopathy, hypertension, diabetic wound infection, chronic kidney disease, peripheral arterial disease, preoperative cardiovascular evaluation    Interim History: The patient remains asymptomatic with persistent atrial flutter and marginal rate control requiring resumption of intravenous diltiazem.  His vascular assessment has been reviewed with plans of peripheral angiography later today.  Repeat assessment of renal function and electrolytes demonstrates stable serum creatinine levels.      Review of Systems:   The remainder of a complete multisystem review including consitutional, central nervous, respiratory, circulatory, gastrointestinal, genitourinary, endocrinologic, hematologic, musculoskeletal and psychiatric are negative.    Problem List:  Patient Active Problem List   Diagnosis    Essential hypertension    CKD (chronic kidney disease) stage 3, GFR 30-59 ml/min (HCC)    Thrombocytopenia (HCC)    Diabetes mellitus type 2, noninsulin dependent (HCC)    Atrial flutter (HCC)    Cellulitis of right foot    Cellulitis of fifth toe of right foot    Urinary retention    Primary hypertension    Atrial fibrillation (HCC)    Displaced fracture of fifth metatarsal bone, right foot, initial encounter for closed fracture    Cardiomyopathy (HCC)    Type 2 diabetes mellitus with foot ulcer, without long-term current use of insulin (HCC)    Pure hypercholesterolemia    Preoperative cardiovascular examination       Allergies:  No Known Allergies    Current Medications:  Current Facility-Administered Medications   Medication Dose Route Frequency Provider Last Rate Last Admin    [Held by provider] apixaban (ELIQUIS) tablet 5 mg  5 mg Oral BID Srinivas Rojas MD        metoprolol succinate (TOPROL XL) extended  Date    HDL 29 05/23/2023     No components found for: \"LDLCALC\"    Cardiac Tests:  Telemetry findings reviewed: atrial flutter with a mean ventricular response of approximately 100 bpm, no new tachy/bradyarrhythmias overnight      ASSESSMENT / PLAN: On a clinical basis, the patient remains compensated from a cardiovascular standpoint in the face of his persistent atrial flutter with persistent marginal rate control in spite of attempted modification of his medical regimen presently requiring intravenous diltiazem for stabilization.  As outlined and further necessary on the basis of withholding of his present oral anticoagulant dosing pending the completion of peripheral angiography, and ongoing rate control strategy will be maintained with determination of resumption of anticoagulation based on the angiographic findings and podiatry considerations.  A limited echocardiogram will be obtained to reevaluate left ventricular systolic function and guide additional management recommendations in the face of his previously noted cardiomyopathy.  Presently to assist rate control a dose of digitalis will be administered with attempts to wean and discontinue intravenous diltiazem as tolerated.  Should surgical intervention be noted following the achievement of rate control, he would be an acceptable candidate with recommendations as previously outlined of administration of his beta-blocker on the morning of the surgical procedure to assist rate stabilization as well as blood pressure regulation and cautious periprocedural fluid administration to reduce risk of iatrogenic volume overload and/or perioperative congestive heart failure.  Continued aggressive risk factor modification of blood pressure, diabetes and serum lipids will remain essential to reducing risk of future atherosclerotic development.      Note: This report was completed utilizing computer voice recognition software. Every effort has been made to ensure

## 2024-11-27 NOTE — SEDATION DOCUMENTATION
[facility], [facility]  Moderate Sedation Pre procedure Evaluation    Pt Name: Marcello Edwards    [id number]  Provider Performing Procedure: Kasey Del Toro MD  Primary Care Physician: Toy Cutler MD    PRE-PROCEDURE   DNR-CCA/DNR-CC []Yes [x]No  Brief History/Pre-Procedure Diagnosis/Procedural indication: tissue loss          MEDICAL HISTORY      has a past medical history of A-fib (HCC), Hx of colonoscopy, Hypertension, Paget's disease of bone, and Urinary retention with incomplete bladder emptying.    JESSI: [] Yes   [x] No   [] At Risk    SURGICAL HISTORY   has a past surgical history that includes hernia repair; Bladder stone removal; Cataract removal with implant (Bilateral); and Total knee arthroplasty (Left).    PRIOR ANESTHESIA DIFFICULTIES OR COMPLICATIONS: none    ALLERGIES   Allergies as of 11/26/2024    (No Known Allergies)        MEDICATIONS   No current facility-administered medications for this encounter.  Prior to Admission medications    Medication Sig Start Date End Date Taking? Authorizing Provider   metoprolol succinate (TOPROL XL) 100 MG extended release tablet Take 1 tablet by mouth daily 8/22/24   Leonarda Fields MD   diphenhydrAMINE-APAP, sleep, (TYLENOL PM EXTRA STRENGTH PO) Take by mouth nightly as needed    Leonarda Fields MD   dilTIAZem (CARDIZEM 12 HR) 60 MG extended release capsule Take 1 capsule by mouth 2 times daily 10/24/24   Leonarda Fields MD   Multiple Vitamin (MULTIVITAMIN) TABS tablet Take 1 tablet by mouth daily    Leonarda Fields MD   apixaban (ELIQUIS) 2.5 MG TABS tablet Take 1 tablet by mouth 2 times daily 5/23/23   Leonarda Fields MD         VITAL SIGNS   Vitals:    11/27/24 1409   BP:    Pulse: (!) 107   Resp: 17   Temp:    SpO2:        NPO:  yes    PLANNED PROCEDURE AND LOCATION  [] Cardiovascular Lab Cardiac Procedure:     [x] Cardiovascular Lab Vascular Procedure: angio  []

## 2024-11-27 NOTE — DISCHARGE SUMMARY
[facility] Hospitalist Physician Discharge Summary       Toy Cutler MD  [address] [facility] [address]  [phone]    Schedule an appointment as soon as possible for a visit in 7 day(s)  Call to make a HOSPITAL FOLLOW UP appointment within 7-14 days of discharge from the hospital      Activity level: As tolerated     Dispo: [facility]       Condition on discharge: Stable     Patient ID:  Marcello Edwards  [id number]  89 y.o.  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 *      Consults:  IP CONSULT TO INTERNAL MEDICINE  IP CONSULT TO PODIATRY  IP CONSULT TO INFECTIOUS DISEASES  IP CONSULT TO CARDIOLOGY  IP CONSULT TO VASCULAR SURGERY  IP CONSULT TO INTERNAL MEDICINE  IP CONSULT TO CARDIOLOGY    Hospital Course:   Patient Marcello Edwards is a 89 y.o. presented with

## 2024-11-28 LAB
ALBUMIN SERPL-MCNC: 3.3 G/DL (ref 3.5–5.2)
ALP SERPL-CCNC: 109 U/L (ref 40–129)
ALT SERPL-CCNC: 24 U/L (ref 0–40)
ANION GAP SERPL CALCULATED.3IONS-SCNC: 12 MMOL/L (ref 7–16)
AST SERPL-CCNC: 29 U/L (ref 0–39)
BASOPHILS # BLD: 0.05 K/UL (ref 0–0.2)
BASOPHILS NFR BLD: 1 % (ref 0–2)
BILIRUB SERPL-MCNC: 0.6 MG/DL (ref 0–1.2)
BUN SERPL-MCNC: 17 MG/DL (ref 6–23)
CALCIUM SERPL-MCNC: 8.9 MG/DL (ref 8.6–10.2)
CHLORIDE SERPL-SCNC: 102 MMOL/L (ref 98–107)
CO2 SERPL-SCNC: 21 MMOL/L (ref 22–29)
CREAT SERPL-MCNC: 1.3 MG/DL (ref 0.7–1.2)
EOSINOPHIL # BLD: 0.09 K/UL (ref 0.05–0.5)
EOSINOPHILS RELATIVE PERCENT: 1 % (ref 0–6)
ERYTHROCYTE [DISTWIDTH] IN BLOOD BY AUTOMATED COUNT: 13.4 % (ref 11.5–15)
GFR, ESTIMATED: 52 ML/MIN/1.73M2
GLUCOSE SERPL-MCNC: 194 MG/DL (ref 74–99)
HCT VFR BLD AUTO: 38.6 % (ref 37–54)
HGB BLD-MCNC: 12.8 G/DL (ref 12.5–16.5)
IMM GRANULOCYTES # BLD AUTO: 0.03 K/UL (ref 0–0.58)
IMM GRANULOCYTES NFR BLD: 0 % (ref 0–5)
LYMPHOCYTES NFR BLD: 0.86 K/UL (ref 1.5–4)
LYMPHOCYTES RELATIVE PERCENT: 10 % (ref 20–42)
MCH RBC QN AUTO: 30.2 PG (ref 26–35)
MCHC RBC AUTO-ENTMCNC: 33.2 G/DL (ref 32–34.5)
MCV RBC AUTO: 91 FL (ref 80–99.9)
MONOCYTES NFR BLD: 0.67 K/UL (ref 0.1–0.95)
MONOCYTES NFR BLD: 8 % (ref 2–12)
NEUTROPHILS NFR BLD: 80 % (ref 43–80)
NEUTS SEG NFR BLD: 6.8 K/UL (ref 1.8–7.3)
PLATELET # BLD AUTO: 154 K/UL (ref 130–450)
PMV BLD AUTO: 10 FL (ref 7–12)
POTASSIUM SERPL-SCNC: 3.9 MMOL/L (ref 3.5–5)
PROT SERPL-MCNC: 6.6 G/DL (ref 6.4–8.3)
RBC # BLD AUTO: 4.24 M/UL (ref 3.8–5.8)
SODIUM SERPL-SCNC: 135 MMOL/L (ref 132–146)
WBC OTHER # BLD: 8.5 K/UL (ref 4.5–11.5)

## 2024-11-28 PROCEDURE — 6370000000 HC RX 637 (ALT 250 FOR IP): Performed by: INTERNAL MEDICINE

## 2024-11-28 PROCEDURE — 99233 SBSQ HOSP IP/OBS HIGH 50: CPT | Performed by: INTERNAL MEDICINE

## 2024-11-28 PROCEDURE — 2060000000 HC ICU INTERMEDIATE R&B

## 2024-11-28 PROCEDURE — 85025 COMPLETE CBC W/AUTO DIFF WBC: CPT

## 2024-11-28 PROCEDURE — 80053 COMPREHEN METABOLIC PANEL: CPT

## 2024-11-28 PROCEDURE — 99232 SBSQ HOSP IP/OBS MODERATE 35: CPT | Performed by: INTERNAL MEDICINE

## 2024-11-28 PROCEDURE — 6370000000 HC RX 637 (ALT 250 FOR IP): Performed by: STUDENT IN AN ORGANIZED HEALTH CARE EDUCATION/TRAINING PROGRAM

## 2024-11-28 PROCEDURE — 2580000003 HC RX 258: Performed by: INTERNAL MEDICINE

## 2024-11-28 RX ORDER — DILTIAZEM HYDROCHLORIDE 60 MG/1
60 CAPSULE, EXTENDED RELEASE ORAL 2 TIMES DAILY
Status: DISCONTINUED | OUTPATIENT
Start: 2024-11-28 | End: 2024-11-29

## 2024-11-28 RX ORDER — METOPROLOL SUCCINATE 100 MG/1
100 TABLET, EXTENDED RELEASE ORAL DAILY
Status: DISCONTINUED | OUTPATIENT
Start: 2024-11-28 | End: 2024-11-29

## 2024-11-28 RX ADMIN — SODIUM CHLORIDE, PRESERVATIVE FREE 10 ML: 5 INJECTION INTRAVENOUS at 20:55

## 2024-11-28 RX ADMIN — CLOPIDOGREL BISULFATE 75 MG: 75 TABLET ORAL at 08:22

## 2024-11-28 RX ADMIN — METOPROLOL SUCCINATE 75 MG: 50 TABLET, EXTENDED RELEASE ORAL at 08:21

## 2024-11-28 RX ADMIN — CEPHALEXIN 1000 MG: 500 CAPSULE ORAL at 06:00

## 2024-11-28 RX ADMIN — DILTIAZEM HYDROCHLORIDE 60 MG: 60 CAPSULE, EXTENDED RELEASE ORAL at 11:40

## 2024-11-28 RX ADMIN — DILTIAZEM HYDROCHLORIDE 60 MG: 60 CAPSULE, EXTENDED RELEASE ORAL at 20:54

## 2024-11-28 ASSESSMENT — PAIN SCALES - GENERAL: PAINLEVEL_OUTOF10: 0

## 2024-11-28 NOTE — PLAN OF CARE
Problem: Chronic Conditions and Co-morbidities  Goal: Patient's chronic conditions and co-morbidity symptoms are monitored and maintained or improved  11/27/2024 2252 by Brandi Dee RN  Outcome: Progressing     Problem: Discharge Planning  Goal: Discharge to home or other facility with appropriate resources  11/27/2024 2252 by Brandi Dee RN  Outcome: Progressing     Problem: Safety - Adult  Goal: Free from fall injury  11/27/2024 2252 by Brandi Dee RN  Outcome: Progressing

## 2024-11-28 NOTE — PROGRESS NOTES
[facility] Hospitalist Progress Note    Admitting Date and Time: 11/27/2024  5:13 PM  Admit Dx: cellulitis of the fifth toe right foot    Subjective:  Patient is being followed for cellulitis of the fifth toe right foot     No acute events overnight    ROS: denies fever, chills, cp, sob, n/v, HA unless stated above.      sodium chloride flush  5-40 mL IntraVENous 2 times per day    apixaban  5 mg Oral BID    metoprolol succinate  75 mg Oral BID    cephALEXin  1,000 mg Oral 3 times per day    clopidogrel  75 mg Oral Daily     sodium chloride flush, 5-40 mL, PRN  sodium chloride, , PRN  ondansetron, 4 mg, Q8H PRN   Or  ondansetron, 4 mg, Q6H PRN  polyethylene glycol, 17 g, Daily PRN  acetaminophen, 650 mg, Q6H PRN   Or  acetaminophen, 650 mg, Q6H PRN  perflutren lipid microspheres, 1.5 mL, ONCE PRN  calcium carbonate, 500 mg, TID PRN         Objective:    /80   Pulse (!) 118   Temp 98.1 °F (36.7 °C) (Oral)   Resp 20   SpO2 95%     General Appearance: alert and oriented to person, place and time and in no acute distress  Skin: warm and dry  Head: normocephalic and atraumatic  Eyes: pupils equal, round, extraocular eye movements intact, conjunctivae normal  Pulmonary/Chest: clear to auscultation bilaterally- no wheezes, rales or rhonchi, normal air movement, no respiratory distress  Cardiovascular: normal rate, normal S1 and S2   Abdomen: soft, non-tender, non-distended, normal bowel sounds,   Extremities: no cyanosis, no clubbing and no edema, right 5th toe necrosis  Neurologic: no cranial nerve deficit and speech normal, did not assess ambulation      Recent Labs     11/26/24  0348 11/27/24  0521 11/28/24  0354    135 135   K 3.8 3.5 3.9    103 102   CO2 21* 21* 21*   BUN 18 16 17   CREATININE 1.2 1.0 1.3*   GLUCOSE 205* 169* 194*   CALCIUM 9.1 8.7 8.9       Recent Labs     11/26/24  0348 11/27/24  0521 11/28/24  0354   WBC 8.9 8.8 8.5   RBC 4.26 4.03 4.24   HGB 13.2 12.3* 12.8   HCT

## 2024-11-29 PROBLEM — L03.031 CELLULITIS OF RIGHT TOE: Status: ACTIVE | Noted: 2024-11-29

## 2024-11-29 LAB
ANION GAP SERPL CALCULATED.3IONS-SCNC: 10 MMOL/L (ref 7–16)
BASOPHILS # BLD: 0.04 K/UL (ref 0–0.2)
BASOPHILS NFR BLD: 1 % (ref 0–2)
BUN SERPL-MCNC: 15 MG/DL (ref 6–23)
CALCIUM SERPL-MCNC: 8.7 MG/DL (ref 8.6–10.2)
CHLORIDE SERPL-SCNC: 103 MMOL/L (ref 98–107)
CO2 SERPL-SCNC: 22 MMOL/L (ref 22–29)
CREAT SERPL-MCNC: 1.1 MG/DL (ref 0.7–1.2)
EOSINOPHIL # BLD: 0.12 K/UL (ref 0.05–0.5)
EOSINOPHILS RELATIVE PERCENT: 1 % (ref 0–6)
ERYTHROCYTE [DISTWIDTH] IN BLOOD BY AUTOMATED COUNT: 13.3 % (ref 11.5–15)
GFR, ESTIMATED: 62 ML/MIN/1.73M2
GLUCOSE SERPL-MCNC: 140 MG/DL (ref 74–99)
HCT VFR BLD AUTO: 36.9 % (ref 37–54)
HGB BLD-MCNC: 12.4 G/DL (ref 12.5–16.5)
IMM GRANULOCYTES # BLD AUTO: <0.03 K/UL (ref 0–0.58)
IMM GRANULOCYTES NFR BLD: 0 % (ref 0–5)
LYMPHOCYTES NFR BLD: 0.99 K/UL (ref 1.5–4)
LYMPHOCYTES RELATIVE PERCENT: 12 % (ref 20–42)
MCH RBC QN AUTO: 30.8 PG (ref 26–35)
MCHC RBC AUTO-ENTMCNC: 33.6 G/DL (ref 32–34.5)
MCV RBC AUTO: 91.6 FL (ref 80–99.9)
MONOCYTES NFR BLD: 0.72 K/UL (ref 0.1–0.95)
MONOCYTES NFR BLD: 9 % (ref 2–12)
NEUTROPHILS NFR BLD: 77 % (ref 43–80)
NEUTS SEG NFR BLD: 6.41 K/UL (ref 1.8–7.3)
PLATELET # BLD AUTO: 154 K/UL (ref 130–450)
PMV BLD AUTO: 10.4 FL (ref 7–12)
POTASSIUM SERPL-SCNC: 3.6 MMOL/L (ref 3.5–5)
RBC # BLD AUTO: 4.03 M/UL (ref 3.8–5.8)
SODIUM SERPL-SCNC: 135 MMOL/L (ref 132–146)
WBC OTHER # BLD: 8.3 K/UL (ref 4.5–11.5)

## 2024-11-29 PROCEDURE — 2580000003 HC RX 258: Performed by: INTERNAL MEDICINE

## 2024-11-29 PROCEDURE — 85025 COMPLETE CBC W/AUTO DIFF WBC: CPT

## 2024-11-29 PROCEDURE — 99223 1ST HOSP IP/OBS HIGH 75: CPT | Performed by: INTERNAL MEDICINE

## 2024-11-29 PROCEDURE — 6370000000 HC RX 637 (ALT 250 FOR IP): Performed by: INTERNAL MEDICINE

## 2024-11-29 PROCEDURE — 6370000000 HC RX 637 (ALT 250 FOR IP): Performed by: STUDENT IN AN ORGANIZED HEALTH CARE EDUCATION/TRAINING PROGRAM

## 2024-11-29 PROCEDURE — 99233 SBSQ HOSP IP/OBS HIGH 50: CPT | Performed by: INTERNAL MEDICINE

## 2024-11-29 PROCEDURE — 2060000000 HC ICU INTERMEDIATE R&B

## 2024-11-29 PROCEDURE — 80048 BASIC METABOLIC PNL TOTAL CA: CPT

## 2024-11-29 RX ORDER — DILTIAZEM HYDROCHLORIDE 120 MG/1
120 CAPSULE, COATED, EXTENDED RELEASE ORAL DAILY
Status: DISCONTINUED | OUTPATIENT
Start: 2024-11-30 | End: 2024-12-01

## 2024-11-29 RX ORDER — DILTIAZEM HYDROCHLORIDE 30 MG/1
30 TABLET, FILM COATED ORAL EVERY 6 HOURS SCHEDULED
Status: COMPLETED | OUTPATIENT
Start: 2024-11-29 | End: 2024-11-29

## 2024-11-29 RX ADMIN — CLOPIDOGREL BISULFATE 75 MG: 75 TABLET ORAL at 08:51

## 2024-11-29 RX ADMIN — SODIUM CHLORIDE, PRESERVATIVE FREE 10 ML: 5 INJECTION INTRAVENOUS at 20:25

## 2024-11-29 RX ADMIN — DILTIAZEM HYDROCHLORIDE 30 MG: 30 TABLET ORAL at 17:03

## 2024-11-29 RX ADMIN — METOPROLOL SUCCINATE 75 MG: 50 TABLET, EXTENDED RELEASE ORAL at 08:51

## 2024-11-29 RX ADMIN — DILTIAZEM HYDROCHLORIDE 30 MG: 30 TABLET ORAL at 11:00

## 2024-11-29 RX ADMIN — SODIUM CHLORIDE, PRESERVATIVE FREE 10 ML: 5 INJECTION INTRAVENOUS at 08:55

## 2024-11-29 RX ADMIN — METOPROLOL SUCCINATE 75 MG: 50 TABLET, EXTENDED RELEASE ORAL at 20:26

## 2024-11-29 RX ADMIN — CALCIUM CARBONATE 500 MG: 500 TABLET, CHEWABLE ORAL at 10:07

## 2024-11-29 NOTE — PROGRESS NOTES
Spiritual Health History and Assessment/Progress Note  [facility]    Initial Encounter, Attempted Encounter,  ,  ,      Name: Marcello Edwards MRN: [id number]    Age: 89 y.o.     Sex: male   Language: English   Advent: None   Cellulitis of right toe     Date: 11/29/2024                           Spiritual Assessment began in [address] MED SURG        Referral/Consult From: Rounding   Encounter Overview/Reason: Initial Encounter, Attempted Encounter  Service Provided For: Patient not available, Patient    Lilly, Belief, Meaning:   Patient unable to assess at this time  Family/Friends No family/friends present      Importance and Influence:  Patient unable to assess at this time  Family/Friends No family/friends present    Community:  Patient feels well-supported. Support system includes: Children  Family/Friends No family/friends present    Assessment and Plan of Care:     Patient Interventions include: Other: Staff addressing the patient, silent prayer was said for the patient at the time  Family/Friends Interventions include: No family/friends present    Patient Plan of Care: Spiritual Care available upon further referral  Family/Friends Plan of Care: No family/friends present    Electronically signed by Chaplain Bret on 11/29/2024 at 12:03 PM

## 2024-11-29 NOTE — PROGRESS NOTES
Recent Labs     11/27/24  0521 11/28/24  0354 11/29/24  0330   WBC 8.8 8.5 8.3   RBC 4.03 4.24 4.03   HGB 12.3* 12.8 12.4*   HCT 37.9 38.6 36.9*   MCV 94.0 91.0 91.6   MCH 30.5 30.2 30.8   MCHC 32.5 33.2 33.6   RDW 13.3 13.4 13.3    154 154   MPV 10.5 10.0 10.4       Assessment:    Active Problems:    * No active hospital problems. *  Resolved Problems:    * No resolved hospital problems. *      Plan:    Right necrotic fifth toe 2/2 PAD S/P Balloon angioplasty to the superficial femoral artery, popliteal artery, right tibial artery, right peroneal artery - Patient is to continue plavix 75 mg po daily. Pending plans as per podiatry for further surgical plans. Hold off antibiotics as per ID. Waiting to see when podiatry is proceeding with intervention.  Atrial fibrillation with RVR - Continue home toprol and cardizem. Hold eliquis and restart at the 2.5 dosing.  Non oliguric RONNA on CKD II - Renal functions are around 1 at baseline. Will hold nephrotoxins and encourage oral hydration  DVT prophylaxis - SCD    NOTE: This report was transcribed using voice recognition software. Every effort was made to ensure accuracy; however, inadvertent computerized transcription errors may be present.    Electronically signed by Love Ohara DO on 11/29/2024 at 8:06 AM

## 2024-11-29 NOTE — PLAN OF CARE
Problem: Chronic Conditions and Co-morbidities  Goal: Patient's chronic conditions and co-morbidity symptoms are monitored and maintained or improved  11/29/2024 0944 by Josephine Paulino RN  Outcome: Progressing  11/28/2024 2011 by Bubba Le RN  Outcome: Progressing     Problem: Discharge Planning  Goal: Discharge to home or other facility with appropriate resources  11/29/2024 0944 by Josephine Paulino RN  Outcome: Progressing  11/28/2024 2011 by Bubba Le RN  Outcome: Progressing     Problem: Safety - Adult  Goal: Free from fall injury  11/29/2024 0944 by Josephine Paulino RN  Outcome: Progressing  11/28/2024 2011 by Bubba Le RN  Outcome: Progressing  Flowsheets (Taken 11/28/2024 0821 by Terri Ahmadi, RN)  Free From Fall Injury: Instruct family/caregiver on patient safety

## 2024-11-29 NOTE — PLAN OF CARE
Problem: Chronic Conditions and Co-morbidities  Goal: Patient's chronic conditions and co-morbidity symptoms are monitored and maintained or improved  Outcome: Progressing     Problem: Discharge Planning  Goal: Discharge to home or other facility with appropriate resources  Outcome: Progressing     Problem: Safety - Adult  Goal: Free from fall injury  Outcome: Progressing  Flowsheets (Taken 11/28/2024 0821 by Terri Ahmadi RN)  Free From Fall Injury: Instruct family/caregiver on patient safety

## 2024-11-29 NOTE — PROGRESS NOTES
Department of Podiatry  Progress Note    Surgical intervention planned for 11/30/2024; amputation of right fifth digit.  Patient to be n.p.o. at midnight.    SUBJECTIVE:  Marcello Edwards is seen at bedside for infected right fifth toe wound.  Patient states that he recently received a revascularization procedure on the right lower extremity.  Patient states that he has not been having pain and does not have feeling in his right fifth toe.  Procedure to be performed tomorrow explained to patient and he is agreeable with proceeding with right fifth digit amputation.  No acute events overnight. Patient denies any N/V/D/F/C/SOB/CP. No other pedal complaints at this time.     OBJECTIVE:    Scheduled Meds:   metoprolol succinate  75 mg Oral BID    dilTIAZem  30 mg Oral 4 times per day    [Held by provider] apixaban  5 mg Oral BID    [START ON 11/30/2024] dilTIAZem  120 mg Oral Daily    sodium chloride flush  5-40 mL IntraVENous 2 times per day    [Held by provider] cephALEXin  1,000 mg Oral 3 times per day    clopidogrel  75 mg Oral Daily     Continuous Infusions:   sodium chloride       PRN Meds:.sodium chloride flush, sodium chloride, ondansetron **OR** ondansetron, polyethylene glycol, acetaminophen **OR** acetaminophen, perflutren lipid microspheres, calcium carbonate    No Known Allergies    /60   Pulse 90   Temp 98.2 °F (36.8 °C) (Oral)   Resp 18   Ht 1.803 m (5' 11\")   Wt 80.7 kg (178 lb)   SpO2 99%   BMI 24.83 kg/m²       EXAM:    VASCULAR:  DP and PT pulses are palpable. CFT < 5 seconds B/L.  Warm to warm from the tibial tuberosity to the distal aspect of the digits dorsally. Hair growth is not noted to the distal aspects dorsally.    NEUROLOGIC:  light touch present, but absent at the fifth digit of the right foot.    MUSCULOSKELETAL: No deformities noted.  5/5 Gross Muscle strength in all 4 quadrants.    DERM: Wound noted to the dorsal aspect of the right fifth digit.  This wound appears to be covered

## 2024-11-29 NOTE — ACP (ADVANCE CARE PLANNING)
Advance Care Planning   Healthcare Decision Maker:    Primary Decision Maker: Brionna Hamlin - [phone]    Click here to complete Healthcare Decision Makers including selection of the Healthcare Decision Maker Relationship (ie \"Primary\").  Today we documented Decision Maker(s) consistent with Legal Next of Kin hierarchy.

## 2024-11-29 NOTE — CARE COORDINATION
Patient was a discharge/readmit [facility] for vascular work up. S/p PCI/angioplasty to right superficial femoral artery, popliteal artery balloon angioplasty, right anterior tibial balloon angioplasty 11/27/2024. Podiatry is following, await plan for probable 5th toe amputation. Discharge plan is home when medically stable, patient states granddaughter is OT and her  is PT, daughter lives 4 blocks away. Patient doesn't  anticipated needing HHC but if required he would like [facility]. Will continue to follow.  Zoey NGON, RN  Case Management

## 2024-11-29 NOTE — PROGRESS NOTES
INPATIENT CARDIOLOGY FOLLOW-UP    Name: Marcello Edwards    Age: 89 y.o.    Date of Admission: 11/27/2024  5:13 PM    Date of Service: 11/29/2024    Chief Complaint: Follow-up for persistent atrial flutter, cardiomyopathy, hypertension, diabetic wound infection, chronic kidney disease, peripheral arterial disease, preoperative cardiovascular assessment    Interim History: The patient presently remains compensated from a cardiovascular standpoint with present acceptable rate control of his atrial arrhythmias but periods of accelerated heart rates within the past 24 hours and spite of therapy.  Definitive recommendations of the podiatry service regarding management of his weight fifth toe remain pending with continued present with holding of oral anticoagulation.      Review of Systems:   The remainder of a complete multisystem review including consitutional, central nervous, respiratory, circulatory, gastrointestinal, genitourinary, endocrinologic, hematologic, musculoskeletal and psychiatric are negative.    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

## 2024-11-30 ENCOUNTER — ANESTHESIA EVENT (OUTPATIENT)
Dept: OPERATING ROOM | Age: 89
End: 2024-11-30
Payer: OTHER GOVERNMENT

## 2024-11-30 ENCOUNTER — ANESTHESIA (OUTPATIENT)
Dept: OPERATING ROOM | Age: 89
End: 2024-11-30
Payer: OTHER GOVERNMENT

## 2024-11-30 PROBLEM — I96 NECROSIS OF TOE (HCC): Status: ACTIVE | Noted: 2024-11-30

## 2024-11-30 PROBLEM — E11.9 CONTROLLED TYPE 2 DIABETES MELLITUS WITHOUT COMPLICATION (HCC): Status: ACTIVE | Noted: 2024-11-30

## 2024-11-30 LAB
ANION GAP SERPL CALCULATED.3IONS-SCNC: 10 MMOL/L (ref 7–16)
BASOPHILS # BLD: 0.05 K/UL (ref 0–0.2)
BASOPHILS NFR BLD: 1 % (ref 0–2)
BUN SERPL-MCNC: 18 MG/DL (ref 6–23)
CALCIUM SERPL-MCNC: 9 MG/DL (ref 8.6–10.2)
CHLORIDE SERPL-SCNC: 100 MMOL/L (ref 98–107)
CO2 SERPL-SCNC: 24 MMOL/L (ref 22–29)
CREAT SERPL-MCNC: 1.2 MG/DL (ref 0.7–1.2)
EOSINOPHIL # BLD: 0.14 K/UL (ref 0.05–0.5)
EOSINOPHILS RELATIVE PERCENT: 2 % (ref 0–6)
ERYTHROCYTE [DISTWIDTH] IN BLOOD BY AUTOMATED COUNT: 13.4 % (ref 11.5–15)
GFR, ESTIMATED: 58 ML/MIN/1.73M2
GLUCOSE BLD-MCNC: 165 MG/DL (ref 74–99)
GLUCOSE BLD-MCNC: 209 MG/DL (ref 74–99)
GLUCOSE SERPL-MCNC: 153 MG/DL (ref 74–99)
HBA1C MFR BLD: 8.8 % (ref 4–5.6)
HCT VFR BLD AUTO: 37.6 % (ref 37–54)
HGB BLD-MCNC: 12.7 G/DL (ref 12.5–16.5)
IMM GRANULOCYTES # BLD AUTO: <0.03 K/UL (ref 0–0.58)
IMM GRANULOCYTES NFR BLD: 0 % (ref 0–5)
LYMPHOCYTES NFR BLD: 1.11 K/UL (ref 1.5–4)
LYMPHOCYTES RELATIVE PERCENT: 15 % (ref 20–42)
MCH RBC QN AUTO: 30.9 PG (ref 26–35)
MCHC RBC AUTO-ENTMCNC: 33.8 G/DL (ref 32–34.5)
MCV RBC AUTO: 91.5 FL (ref 80–99.9)
MICROORGANISM SPEC CULT: NORMAL
MICROORGANISM SPEC CULT: NORMAL
MONOCYTES NFR BLD: 0.6 K/UL (ref 0.1–0.95)
MONOCYTES NFR BLD: 8 % (ref 2–12)
NEUTROPHILS NFR BLD: 74 % (ref 43–80)
NEUTS SEG NFR BLD: 5.46 K/UL (ref 1.8–7.3)
PLATELET # BLD AUTO: 168 K/UL (ref 130–450)
PMV BLD AUTO: 10.3 FL (ref 7–12)
POTASSIUM SERPL-SCNC: 4 MMOL/L (ref 3.5–5)
RBC # BLD AUTO: 4.11 M/UL (ref 3.8–5.8)
SERVICE CMNT-IMP: NORMAL
SERVICE CMNT-IMP: NORMAL
SODIUM SERPL-SCNC: 134 MMOL/L (ref 132–146)
SPECIMEN DESCRIPTION: NORMAL
SPECIMEN DESCRIPTION: NORMAL
WBC OTHER # BLD: 7.4 K/UL (ref 4.5–11.5)

## 2024-11-30 PROCEDURE — 83036 HEMOGLOBIN GLYCOSYLATED A1C: CPT

## 2024-11-30 PROCEDURE — 85025 COMPLETE CBC W/AUTO DIFF WBC: CPT

## 2024-11-30 PROCEDURE — 6360000002 HC RX W HCPCS: Performed by: INTERNAL MEDICINE

## 2024-11-30 PROCEDURE — 6360000002 HC RX W HCPCS: Performed by: NURSE ANESTHETIST, CERTIFIED REGISTERED

## 2024-11-30 PROCEDURE — 6360000002 HC RX W HCPCS: Performed by: PODIATRIST

## 2024-11-30 PROCEDURE — 2060000000 HC ICU INTERMEDIATE R&B

## 2024-11-30 PROCEDURE — 99232 SBSQ HOSP IP/OBS MODERATE 35: CPT | Performed by: INTERNAL MEDICINE

## 2024-11-30 PROCEDURE — 7100000010 HC PHASE II RECOVERY - FIRST 15 MIN: Performed by: PODIATRIST

## 2024-11-30 PROCEDURE — 2709999900 HC NON-CHARGEABLE SUPPLY: Performed by: PODIATRIST

## 2024-11-30 PROCEDURE — 3600000012 HC SURGERY LEVEL 2 ADDTL 15MIN: Performed by: PODIATRIST

## 2024-11-30 PROCEDURE — 3700000000 HC ANESTHESIA ATTENDED CARE: Performed by: PODIATRIST

## 2024-11-30 PROCEDURE — 82947 ASSAY GLUCOSE BLOOD QUANT: CPT

## 2024-11-30 PROCEDURE — 6370000000 HC RX 637 (ALT 250 FOR IP): Performed by: INTERNAL MEDICINE

## 2024-11-30 PROCEDURE — 7100000011 HC PHASE II RECOVERY - ADDTL 15 MIN: Performed by: PODIATRIST

## 2024-11-30 PROCEDURE — 99233 SBSQ HOSP IP/OBS HIGH 50: CPT | Performed by: INTERNAL MEDICINE

## 2024-11-30 PROCEDURE — 2580000003 HC RX 258: Performed by: INTERNAL MEDICINE

## 2024-11-30 PROCEDURE — 6370000000 HC RX 637 (ALT 250 FOR IP)

## 2024-11-30 PROCEDURE — 2580000003 HC RX 258: Performed by: NURSE ANESTHETIST, CERTIFIED REGISTERED

## 2024-11-30 PROCEDURE — 3700000001 HC ADD 15 MINUTES (ANESTHESIA): Performed by: PODIATRIST

## 2024-11-30 PROCEDURE — 88305 TISSUE EXAM BY PATHOLOGIST: CPT

## 2024-11-30 PROCEDURE — 80048 BASIC METABOLIC PNL TOTAL CA: CPT

## 2024-11-30 PROCEDURE — 3600000002 HC SURGERY LEVEL 2 BASE: Performed by: PODIATRIST

## 2024-11-30 PROCEDURE — 6370000000 HC RX 637 (ALT 250 FOR IP): Performed by: STUDENT IN AN ORGANIZED HEALTH CARE EDUCATION/TRAINING PROGRAM

## 2024-11-30 PROCEDURE — 88311 DECALCIFY TISSUE: CPT

## 2024-11-30 PROCEDURE — 2580000003 HC RX 258

## 2024-11-30 RX ORDER — BUPIVACAINE HYDROCHLORIDE 5 MG/ML
INJECTION, SOLUTION EPIDURAL; INTRACAUDAL PRN
Status: DISCONTINUED | OUTPATIENT
Start: 2024-11-30 | End: 2024-11-30 | Stop reason: ALTCHOICE

## 2024-11-30 RX ORDER — INSULIN LISPRO 100 [IU]/ML
0-4 INJECTION, SOLUTION INTRAVENOUS; SUBCUTANEOUS
Status: DISCONTINUED | OUTPATIENT
Start: 2024-11-30 | End: 2024-12-02 | Stop reason: HOSPADM

## 2024-11-30 RX ORDER — ONDANSETRON 4 MG/1
4 TABLET, ORALLY DISINTEGRATING ORAL EVERY 8 HOURS PRN
Status: DISCONTINUED | OUTPATIENT
Start: 2024-11-30 | End: 2024-12-02 | Stop reason: HOSPADM

## 2024-11-30 RX ORDER — CEFAZOLIN SODIUM 1 G/3ML
INJECTION, POWDER, FOR SOLUTION INTRAMUSCULAR; INTRAVENOUS
Status: DISCONTINUED | OUTPATIENT
Start: 2024-11-30 | End: 2024-11-30 | Stop reason: SDUPTHER

## 2024-11-30 RX ORDER — GLUCAGON 1 MG/ML
1 KIT INJECTION PRN
Status: CANCELLED | OUTPATIENT
Start: 2024-11-30

## 2024-11-30 RX ORDER — PROCHLORPERAZINE EDISYLATE 5 MG/ML
5 INJECTION INTRAMUSCULAR; INTRAVENOUS
Status: CANCELLED | OUTPATIENT
Start: 2024-11-30 | End: 2024-12-01

## 2024-11-30 RX ORDER — SODIUM CHLORIDE 9 MG/ML
INJECTION, SOLUTION INTRAVENOUS
Status: DISCONTINUED | OUTPATIENT
Start: 2024-11-30 | End: 2024-11-30 | Stop reason: SDUPTHER

## 2024-11-30 RX ORDER — SODIUM CHLORIDE 9 MG/ML
INJECTION, SOLUTION INTRAVENOUS PRN
Status: CANCELLED | OUTPATIENT
Start: 2024-11-30

## 2024-11-30 RX ORDER — IPRATROPIUM BROMIDE AND ALBUTEROL SULFATE 2.5; .5 MG/3ML; MG/3ML
1 SOLUTION RESPIRATORY (INHALATION)
Status: CANCELLED | OUTPATIENT
Start: 2024-11-30 | End: 2024-12-01

## 2024-11-30 RX ORDER — DIPHENHYDRAMINE HYDROCHLORIDE 50 MG/ML
12.5 INJECTION INTRAMUSCULAR; INTRAVENOUS
Status: CANCELLED | OUTPATIENT
Start: 2024-11-30 | End: 2024-12-01

## 2024-11-30 RX ORDER — GLUCAGON 1 MG/ML
1 KIT INJECTION PRN
Status: DISCONTINUED | OUTPATIENT
Start: 2024-11-30 | End: 2024-12-02 | Stop reason: HOSPADM

## 2024-11-30 RX ORDER — FENTANYL CITRATE 50 UG/ML
25 INJECTION, SOLUTION INTRAMUSCULAR; INTRAVENOUS EVERY 5 MIN PRN
Status: CANCELLED | OUTPATIENT
Start: 2024-11-30

## 2024-11-30 RX ORDER — PROPOFOL 10 MG/ML
INJECTION, EMULSION INTRAVENOUS
Status: DISCONTINUED | OUTPATIENT
Start: 2024-11-30 | End: 2024-11-30 | Stop reason: SDUPTHER

## 2024-11-30 RX ORDER — ONDANSETRON 2 MG/ML
4 INJECTION INTRAMUSCULAR; INTRAVENOUS
Status: CANCELLED | OUTPATIENT
Start: 2024-11-30 | End: 2024-12-01

## 2024-11-30 RX ORDER — DEXTROSE MONOHYDRATE 100 MG/ML
INJECTION, SOLUTION INTRAVENOUS CONTINUOUS PRN
Status: DISCONTINUED | OUTPATIENT
Start: 2024-11-30 | End: 2024-12-02 | Stop reason: HOSPADM

## 2024-11-30 RX ORDER — ONDANSETRON 2 MG/ML
4 INJECTION INTRAMUSCULAR; INTRAVENOUS EVERY 6 HOURS PRN
Status: DISCONTINUED | OUTPATIENT
Start: 2024-11-30 | End: 2024-12-02 | Stop reason: HOSPADM

## 2024-11-30 RX ORDER — SODIUM CHLORIDE 0.9 % (FLUSH) 0.9 %
5-40 SYRINGE (ML) INJECTION PRN
Status: CANCELLED | OUTPATIENT
Start: 2024-11-30

## 2024-11-30 RX ORDER — DIGOXIN 0.25 MG/ML
250 INJECTION INTRAMUSCULAR; INTRAVENOUS EVERY 6 HOURS
Status: DISCONTINUED | OUTPATIENT
Start: 2024-11-30 | End: 2024-12-01

## 2024-11-30 RX ORDER — NALOXONE HYDROCHLORIDE 0.4 MG/ML
INJECTION, SOLUTION INTRAMUSCULAR; INTRAVENOUS; SUBCUTANEOUS PRN
Status: CANCELLED | OUTPATIENT
Start: 2024-11-30

## 2024-11-30 RX ORDER — ENOXAPARIN SODIUM 100 MG/ML
40 INJECTION SUBCUTANEOUS DAILY
Status: DISCONTINUED | OUTPATIENT
Start: 2024-11-30 | End: 2024-12-01

## 2024-11-30 RX ORDER — SODIUM CHLORIDE 9 MG/ML
INJECTION, SOLUTION INTRAVENOUS PRN
Status: DISCONTINUED | OUTPATIENT
Start: 2024-11-30 | End: 2024-12-02 | Stop reason: HOSPADM

## 2024-11-30 RX ORDER — SODIUM CHLORIDE 0.9 % (FLUSH) 0.9 %
5-40 SYRINGE (ML) INJECTION PRN
Status: DISCONTINUED | OUTPATIENT
Start: 2024-11-30 | End: 2024-12-02 | Stop reason: HOSPADM

## 2024-11-30 RX ORDER — SODIUM CHLORIDE 0.9 % (FLUSH) 0.9 %
5-40 SYRINGE (ML) INJECTION EVERY 12 HOURS SCHEDULED
Status: CANCELLED | OUTPATIENT
Start: 2024-11-30

## 2024-11-30 RX ORDER — DEXTROSE MONOHYDRATE 100 MG/ML
INJECTION, SOLUTION INTRAVENOUS CONTINUOUS PRN
Status: CANCELLED | OUTPATIENT
Start: 2024-11-30

## 2024-11-30 RX ORDER — SODIUM CHLORIDE 0.9 % (FLUSH) 0.9 %
5-40 SYRINGE (ML) INJECTION EVERY 12 HOURS SCHEDULED
Status: DISCONTINUED | OUTPATIENT
Start: 2024-11-30 | End: 2024-12-02 | Stop reason: HOSPADM

## 2024-11-30 RX ADMIN — SODIUM CHLORIDE, PRESERVATIVE FREE 10 ML: 5 INJECTION INTRAVENOUS at 20:38

## 2024-11-30 RX ADMIN — CEFAZOLIN 2 G: 1 INJECTION, POWDER, FOR SOLUTION INTRAMUSCULAR; INTRAVENOUS at 07:43

## 2024-11-30 RX ADMIN — DIGOXIN 250 MCG: 0.25 INJECTION INTRAMUSCULAR; INTRAVENOUS at 14:04

## 2024-11-30 RX ADMIN — PROPOFOL 30 MG: 10 INJECTION, EMULSION INTRAVENOUS at 07:37

## 2024-11-30 RX ADMIN — Medication 6 MG: at 23:47

## 2024-11-30 RX ADMIN — PROPOFOL 20 MG: 10 INJECTION, EMULSION INTRAVENOUS at 07:44

## 2024-11-30 RX ADMIN — INSULIN LISPRO 1 UNITS: 100 INJECTION, SOLUTION INTRAVENOUS; SUBCUTANEOUS at 16:03

## 2024-11-30 RX ADMIN — PROPOFOL 20 MG: 10 INJECTION, EMULSION INTRAVENOUS at 07:47

## 2024-11-30 RX ADMIN — METOPROLOL SUCCINATE 75 MG: 50 TABLET, EXTENDED RELEASE ORAL at 09:22

## 2024-11-30 RX ADMIN — SODIUM CHLORIDE, PRESERVATIVE FREE 10 ML: 5 INJECTION INTRAVENOUS at 09:23

## 2024-11-30 RX ADMIN — METOPROLOL SUCCINATE 75 MG: 50 TABLET, EXTENDED RELEASE ORAL at 20:38

## 2024-11-30 RX ADMIN — CLOPIDOGREL BISULFATE 75 MG: 75 TABLET ORAL at 09:22

## 2024-11-30 RX ADMIN — PROPOFOL 20 MG: 10 INJECTION, EMULSION INTRAVENOUS at 07:38

## 2024-11-30 RX ADMIN — SODIUM CHLORIDE, PRESERVATIVE FREE 10 ML: 5 INJECTION INTRAVENOUS at 20:39

## 2024-11-30 RX ADMIN — DILTIAZEM HYDROCHLORIDE 120 MG: 120 CAPSULE, COATED, EXTENDED RELEASE ORAL at 09:22

## 2024-11-30 RX ADMIN — PROPOFOL 20 MG: 10 INJECTION, EMULSION INTRAVENOUS at 07:42

## 2024-11-30 RX ADMIN — SODIUM CHLORIDE: 9 INJECTION, SOLUTION INTRAVENOUS at 07:32

## 2024-11-30 RX ADMIN — PROPOFOL 20 MG: 10 INJECTION, EMULSION INTRAVENOUS at 07:53

## 2024-11-30 ASSESSMENT — PAIN - FUNCTIONAL ASSESSMENT
PAIN_FUNCTIONAL_ASSESSMENT: NONE - DENIES PAIN

## 2024-11-30 NOTE — PLAN OF CARE
Problem: Chronic Conditions and Co-morbidities  Goal: Patient's chronic conditions and co-morbidity symptoms are monitored and maintained or improved  11/30/2024 1012 by Josephine Paulino RN  Outcome: Progressing  11/29/2024 2202 by Arturo Key RN  Outcome: Progressing     Problem: Discharge Planning  Goal: Discharge to home or other facility with appropriate resources  11/30/2024 1012 by Josephine Paulino RN  Outcome: Progressing  11/29/2024 2202 by Arturo Key RN  Outcome: Progressing     Problem: Safety - Adult  Goal: Free from fall injury  11/30/2024 1012 by Josephine Paulino RN  Outcome: Progressing  11/29/2024 2202 by Arturo Key RN  Outcome: Progressing

## 2024-11-30 NOTE — PROGRESS NOTES
INPATIENT CARDIOLOGY FOLLOW-UP    Name: Marcello Edwards    Age: 89 y.o.    Date of Admission: 11/27/2024  5:13 PM    Date of Service: 11/30/2024    Chief Complaint: Follow-up for persistent atrial flutter, cardiomyopathy, hypertension, diabetic wound infection, chronic kidney disease, peripheral arterial disease, preoperative cardiovascular evaluation     Interim History:  No new overnight cardiac complaints.  Patient is feeling much better denies any cardiac symptoms.  Patient went for amputation of the right fifth toe through the level of metatarsophalangeal joint this morning without any perioperative cardiac events.  He noticed increased heart rate this morning remains in A-fib.  The currently with no complaints of CP, SOB, palpitations, dizziness, or lightheadedness.  Atrial fibrillation with RVR on telemetry.    Review of Systems:   Cardiac: As per HPI  General: No fever, chills  Pulmonary: As per HPI  HEENT: No visual disturbances, difficult swallowing  GI: No nausea, vomiting  Endocrine: No thyroid disease or DM  Musculoskeletal: MOORE x 4, no focal motor deficits  Skin: Intact, no rashes  Neuro/Psych: No headache or seizures    Problem List:  Patient Active Problem List   Diagnosis    Essential hypertension    CKD (chronic kidney disease) stage 3, GFR 30-59 ml/min (HCC)    Thrombocytopenia (HCC)    Diabetes mellitus type 2, noninsulin dependent (HCC)    Atrial flutter (HCC)    Cellulitis of right foot    Cellulitis of fifth toe of right foot    Urinary retention    Primary hypertension    Atrial fibrillation (HCC)    Displaced fracture of fifth metatarsal bone, right foot, initial encounter for closed fracture    Cardiomyopathy (HCC)    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    Echocardiogram limited-11/26/2024:     Left Ventricle: The left ventricle is normal in size with normal thickness of endocardial surfaces.  No regional wall motion abnormalities are identified with overall normal left ventricular systolic function.  An estimated ejection fraction of 55-60% is calculated.  Indeterminate diastolic function is present on the basis of atrial flutter.    Aortic Valve: Aortic morphology is suboptimally visualized with calcification of the noncoronary cusp and no evidence of aortic stenosis.    Image quality is adequate.    Stress test:  NA    Cardiac catheterization: NA    ASSESSMENT:  Perioperative cardiac evaluation  Of right fifth toe gangrene status post toe amputation through metatarsophalangeal joint on 11/30/2024 without any perioperative cardiac events.  Persistent atrial fibrillation/flutter with controlled rate>> tachycardic  [id number] score-at least 5  Hypertension  Type 2 diabetes with diabetic foot ulcer  CKD with RONNA creatinine went up from 1-1.3  PAD status post PCI/angioplasty to right superficial femoral artery, popliteal artery balloon angioplasty, right anterior tibial balloon angioplasty-11/27/2024      Plan:   Continue Cardizem CD to 120 mg and increase to 180 starting tomorrow and continue Toprol-XL 75 mg p.o. twice daily, changed on 11/29/2024.  Heart rate remains elevated.  Will give 2 dose of IV digoxin 250 mcg every 6 hours x 2 for better rate control  Continue to hold Eliquis and restart in a.m. if okay with the surgery service  Continue Plavix 75 mg p.o. daily.  Rest as per primary service.  Increase fluid intake to 48 to 64 ounces per day      More  than 50 minutes  was spent counseling the patient, reviewing the medications, results, assessment and the rationale for the above recommendations.       NOTE:  This report was transcribed using voice recognition software.  Every effort was made to ensure

## 2024-11-30 NOTE — ANESTHESIA POSTPROCEDURE EVALUATION
Department of Anesthesiology  Postprocedure Note    Patient: Marcello Edwards  MRN: [id number]  YOB: 1930  Date of evaluation: 11/30/2024    Procedure Summary       Date: 11/30/24 Room / Location: [address]    Anesthesia Start: 0733 Anesthesia Stop: 0811    Procedure: RIGHT FOOT FIFTH TOE AMPUTATION (Right: Foot) Diagnosis:       Cellulitis of fifth toe of right foot      (Cellulitis of fifth toe of right foot [L03.031])    Surgeons: Jose Mccrary DPM Responsible Provider: Bryson Orellana DO    Anesthesia Type: MAC ASA Status: 3            Anesthesia Type: MAC    Zen Phase I:      Zen Phase II:      Anesthesia Post Evaluation    Patient location during evaluation: PACU  Patient participation: complete - patient participated  Level of consciousness: awake and alert  Pain score: 0  Airway patency: patent  Nausea & Vomiting: no vomiting and no nausea  Cardiovascular status: blood pressure returned to baseline and hemodynamically stable  Respiratory status: acceptable, nonlabored ventilation, spontaneous ventilation and room air  Hydration status: stable  Pain management: adequate and satisfactory to patient        No notable events documented.

## 2024-11-30 NOTE — BRIEF OP NOTE
Brief Postoperative Note      Patient: Marcello Edwards  YOB: 1930  MRN: [id number]    Date of Procedure: 11/30/2024    Pre-Op Diagnosis Codes:      * Cellulitis of fifth toe of right foot [L03.031]    Post-Op Diagnosis: Same       Procedure(s):  RIGHT FOOT FIFTH TOE AMPUTATION    Surgeon(s):  Jose Mccrary DPM Kubala, Daniel, DPM    Assistant:  * No surgical staff found *    Anesthesia: Monitor Anesthesia Care    Estimated Blood Loss (mL): Minimal    Complications: None    Specimens:   ID Type Source Tests Collected by Time Destination   A : right foot fifth toe Specimen Toe SURGICAL PATHOLOGY Jose Mccrary DPM 11/30/2024 0736        Implants:  * No implants in log *      Drains: * No LDAs found *    Findings:  Infection Present At Time Of Surgery (PATOS) (choose all levels that have infection present):  - Deep Infection (muscle/fascia) present as evidenced by fluid consistent with infection  Other Findings: necrosis of soft tissue and bone to right fifth toe.  Moderate bleeding observed.    Electronically signed by Jose Mccrary DPM on 11/30/2024 at 7:59 AM

## 2024-11-30 NOTE — PLAN OF CARE
Problem: Chronic Conditions and Co-morbidities  Goal: Patient's chronic conditions and co-morbidity symptoms are monitored and maintained or improved  11/29/2024 2202 by Arturo Key RN  Outcome: Progressing  11/29/2024 0944 by Josephine Paulino RN  Outcome: Progressing     Problem: Discharge Planning  Goal: Discharge to home or other facility with appropriate resources  11/29/2024 2202 by Arturo Key RN  Outcome: Progressing  11/29/2024 0944 by Josephine Paulino RN  Outcome: Progressing     Problem: Safety - Adult  Goal: Free from fall injury  11/29/2024 2202 by Arturo Key RN  Outcome: Progressing  11/29/2024 0944 by Josephine Paulino RN  Outcome: Progressing

## 2024-11-30 NOTE — OP NOTE
[place] [address]                            OPERATIVE REPORT      PATIENT NAME: MANDO ROBIN               : 1930  MED REC NO: [id number]                        ROOM: [facility]  ACCOUNT NO: [id number]                       ADMIT DATE: 2024  PROVIDER: Jose Mccrary DPM      DATE OF PROCEDURE:  2024    SURGEON:  Jose Mccrary DPM    PREOPERATIVE DIAGNOSES:    1. 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  2. Cellulitis, right foot.    POSTOPERATIVE DIAGNOSES:    1. 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  2. Cellulitis, right foot.    PROCEDURE:  Amputation right 5th toe through the level of metatarsophalangeal joint.    ASSISTANT:  Blu Montoya    MATERIALS USED:  3-0 nylon suture.    ANESTHESIA:  Monitored anesthesia care.    INJECTABLES:  20 mL of 0.5% Marcaine plain.    ESTIMATED BLOOD LOSS:  Minimal.    COMPLICATIONS:  None.    HEMOSTASIS:  On the field.    SPECIMEN:  Right 5th toe for pathological examination.    INTRAOPERATIVE FINDINGS:  Necrosis, soft tissue, and bone, right 5th toe.  Moderate bleeding observed, status post endovascular intervention.    INDICATIONS:  89-year-old male presents today for right 5th toe amputation.  Counseled the patient on the nature of the problem, proposed course of procedure, potential benefits, risks, complications, and convalescence in detail.  All questions answered to the patient's satisfaction.  No guarantees given as to the outcome of procedure.    DESCRIPTION OF PROCEDURE:  Under mild sedation, the patient was brought to the operating room and laid on the operative table in supine position.  Right lower extremity scrubbed, prepped, and draped in usual sterile fashion.  At this time, using a #10 blade, performed a linear incision dorsal aspect of the right 5th metatarsophalangeal joint.  Extended incision distally, medially, and laterally to encompass the toe connecting both incisions at the

## 2024-11-30 NOTE — PROGRESS NOTES
[facility] Hospitalist   Progress Note    Admitting Date and Time: 11/27/2024  5:13 PM  Admit Dx: Cellulitis of right toe [L03.031]    Subjective:    Patient was admitted with Cellulitis of right toe [L03.031]. Patient denies fever, chills, cp, sob, n/v.     sodium chloride flush  5-40 mL IntraVENous 2 times per day    enoxaparin  40 mg SubCUTAneous Daily    metoprolol succinate  75 mg Oral BID    [Held by provider] apixaban  5 mg Oral BID    dilTIAZem  120 mg Oral Daily    sodium chloride flush  5-40 mL IntraVENous 2 times per day    [Held by provider] cephALEXin  1,000 mg Oral 3 times per day    clopidogrel  75 mg Oral Daily     sodium chloride flush, 5-40 mL, PRN  sodium chloride, , PRN  ondansetron, 4 mg, Q8H PRN   Or  ondansetron, 4 mg, Q6H PRN  sodium chloride flush, 5-40 mL, PRN  sodium chloride, , PRN  ondansetron, 4 mg, Q8H PRN   Or  ondansetron, 4 mg, Q6H PRN  polyethylene glycol, 17 g, Daily PRN  acetaminophen, 650 mg, Q6H PRN   Or  acetaminophen, 650 mg, Q6H PRN  perflutren lipid microspheres, 1.5 mL, ONCE PRN  calcium carbonate, 500 mg, TID PRN         Objective:    BP (!) 117/90   Pulse (!) 120   Temp 98 °F (36.7 °C) (Oral)   Resp 18   Ht 1.803 m (5' 11\")   Wt 80 kg (176 lb 4.8 oz)   SpO2 100%   BMI 24.59 kg/m²   Skin: warm and dry, no rash or erythema  Pulmonary/Chest: clear to auscultation bilaterally- no wheezes, rales or rhonchi, normal air movement, no respiratory distress  Cardiovascular: rhythm reg at rate of 112  Abdomen: soft, non-tender, non-distended, normal bowel sounds, no masses or organomegaly  Extremities: no cyanosis, no clubbing, and no edema      Recent Labs     11/28/24  0354 11/29/24  0330 11/30/24  0256    135 134   K 3.9 3.6 4.0    103 100   CO2 21* 22 24   BUN 17 15 18   CREATININE 1.3* 1.1 1.2   GLUCOSE 194* 140* 153*   CALCIUM 8.9 8.7 9.0       Recent Labs     11/28/24  0354 11/29/24  0330 11/30/24  0256   WBC 8.5 8.3 7.4   RBC 4.24 4.03

## 2024-11-30 NOTE — PROGRESS NOTES
Dr. Camacho on unit at this time. Pt received 1st dose of digoxin at 1404. HR has been controlled between 70-80s. Per Dr. Camacho, hold second dose of digoxin.    Electronically signed by Josephine Paulino RN on 11/30/2024 at 6:51 PM

## 2024-11-30 NOTE — ANESTHESIA PRE PROCEDURE
ROS.         Other Findings:       Anesthesia Plan      MAC     ASA 3       Induction: intravenous.    MIPS: Postoperative opioids intended and Prophylactic antiemetics administered.  Anesthetic plan and risks discussed with patient.      Plan discussed with CRNA.                Bryson Orellana DO   11/30/2024

## 2024-12-01 LAB
ANION GAP SERPL CALCULATED.3IONS-SCNC: 9 MMOL/L (ref 7–16)
BASOPHILS # BLD: 0.05 K/UL (ref 0–0.2)
BASOPHILS NFR BLD: 1 % (ref 0–2)
BUN SERPL-MCNC: 17 MG/DL (ref 6–23)
CALCIUM SERPL-MCNC: 9 MG/DL (ref 8.6–10.2)
CHLORIDE SERPL-SCNC: 104 MMOL/L (ref 98–107)
CO2 SERPL-SCNC: 24 MMOL/L (ref 22–29)
CREAT SERPL-MCNC: 1.1 MG/DL (ref 0.7–1.2)
EOSINOPHIL # BLD: 0.17 K/UL (ref 0.05–0.5)
EOSINOPHILS RELATIVE PERCENT: 3 % (ref 0–6)
ERYTHROCYTE [DISTWIDTH] IN BLOOD BY AUTOMATED COUNT: 13.4 % (ref 11.5–15)
GFR, ESTIMATED: 60 ML/MIN/1.73M2
GLUCOSE BLD-MCNC: 119 MG/DL (ref 74–99)
GLUCOSE BLD-MCNC: 158 MG/DL (ref 74–99)
GLUCOSE BLD-MCNC: 186 MG/DL (ref 74–99)
GLUCOSE SERPL-MCNC: 121 MG/DL (ref 74–99)
HCT VFR BLD AUTO: 37.3 % (ref 37–54)
HGB BLD-MCNC: 12.5 G/DL (ref 12.5–16.5)
IMM GRANULOCYTES # BLD AUTO: <0.03 K/UL (ref 0–0.58)
IMM GRANULOCYTES NFR BLD: 0 % (ref 0–5)
LYMPHOCYTES NFR BLD: 0.85 K/UL (ref 1.5–4)
LYMPHOCYTES RELATIVE PERCENT: 13 % (ref 20–42)
MCH RBC QN AUTO: 30.7 PG (ref 26–35)
MCHC RBC AUTO-ENTMCNC: 33.5 G/DL (ref 32–34.5)
MCV RBC AUTO: 91.6 FL (ref 80–99.9)
MONOCYTES NFR BLD: 0.54 K/UL (ref 0.1–0.95)
MONOCYTES NFR BLD: 8 % (ref 2–12)
NEUTROPHILS NFR BLD: 75 % (ref 43–80)
NEUTS SEG NFR BLD: 4.83 K/UL (ref 1.8–7.3)
PLATELET # BLD AUTO: 159 K/UL (ref 130–450)
PMV BLD AUTO: 10.3 FL (ref 7–12)
POTASSIUM SERPL-SCNC: 4.1 MMOL/L (ref 3.5–5)
RBC # BLD AUTO: 4.07 M/UL (ref 3.8–5.8)
SODIUM SERPL-SCNC: 137 MMOL/L (ref 132–146)
TSH SERPL DL<=0.05 MIU/L-ACNC: 6.39 UIU/ML (ref 0.27–4.2)
WBC OTHER # BLD: 6.5 K/UL (ref 4.5–11.5)

## 2024-12-01 PROCEDURE — 84443 ASSAY THYROID STIM HORMONE: CPT

## 2024-12-01 PROCEDURE — 6370000000 HC RX 637 (ALT 250 FOR IP): Performed by: INTERNAL MEDICINE

## 2024-12-01 PROCEDURE — 2060000000 HC ICU INTERMEDIATE R&B

## 2024-12-01 PROCEDURE — 80048 BASIC METABOLIC PNL TOTAL CA: CPT

## 2024-12-01 PROCEDURE — 99233 SBSQ HOSP IP/OBS HIGH 50: CPT | Performed by: INTERNAL MEDICINE

## 2024-12-01 PROCEDURE — 6370000000 HC RX 637 (ALT 250 FOR IP)

## 2024-12-01 PROCEDURE — 6370000000 HC RX 637 (ALT 250 FOR IP): Performed by: STUDENT IN AN ORGANIZED HEALTH CARE EDUCATION/TRAINING PROGRAM

## 2024-12-01 PROCEDURE — 2580000003 HC RX 258

## 2024-12-01 PROCEDURE — 85025 COMPLETE CBC W/AUTO DIFF WBC: CPT

## 2024-12-01 PROCEDURE — 82947 ASSAY GLUCOSE BLOOD QUANT: CPT

## 2024-12-01 PROCEDURE — 2580000003 HC RX 258: Performed by: INTERNAL MEDICINE

## 2024-12-01 PROCEDURE — 6360000002 HC RX W HCPCS: Performed by: INTERNAL MEDICINE

## 2024-12-01 PROCEDURE — 6360000002 HC RX W HCPCS

## 2024-12-01 RX ORDER — CLOPIDOGREL BISULFATE 75 MG/1
75 TABLET ORAL DAILY
Qty: 30 TABLET | Refills: 3 | Status: SHIPPED | OUTPATIENT
Start: 2024-12-02

## 2024-12-01 RX ORDER — CEPHALEXIN 500 MG/1
1000 CAPSULE ORAL EVERY 8 HOURS SCHEDULED
Qty: 32 CAPSULE | Refills: 0 | Status: SHIPPED | OUTPATIENT
Start: 2024-12-01 | End: 2024-12-07

## 2024-12-01 RX ORDER — METOPROLOL SUCCINATE 25 MG/1
75 TABLET, EXTENDED RELEASE ORAL 2 TIMES DAILY
Qty: 30 TABLET | Refills: 3 | Status: SHIPPED | OUTPATIENT
Start: 2024-12-01 | End: 2024-12-02 | Stop reason: HOSPADM

## 2024-12-01 RX ORDER — AMIODARONE HYDROCHLORIDE 200 MG/1
200 TABLET ORAL DAILY
Status: DISCONTINUED | OUTPATIENT
Start: 2024-12-07 | End: 2024-12-01

## 2024-12-01 RX ORDER — CALCIUM CARBONATE 500 MG/1
500 TABLET, CHEWABLE ORAL 3 TIMES DAILY PRN
Qty: 30 TABLET | Refills: 0 | Status: SHIPPED | OUTPATIENT
Start: 2024-12-01 | End: 2024-12-31

## 2024-12-01 RX ORDER — DILTIAZEM HYDROCHLORIDE 120 MG/1
120 CAPSULE, COATED, EXTENDED RELEASE ORAL DAILY
Qty: 30 CAPSULE | Refills: 3 | Status: SHIPPED | OUTPATIENT
Start: 2024-12-02 | End: 2024-12-02 | Stop reason: HOSPADM

## 2024-12-01 RX ORDER — METOPROLOL SUCCINATE 100 MG/1
100 TABLET, EXTENDED RELEASE ORAL 2 TIMES DAILY
Status: DISCONTINUED | OUTPATIENT
Start: 2024-12-01 | End: 2024-12-02 | Stop reason: HOSPADM

## 2024-12-01 RX ORDER — METOPROLOL SUCCINATE 100 MG/1
100 TABLET, EXTENDED RELEASE ORAL 2 TIMES DAILY
Status: DISCONTINUED | OUTPATIENT
Start: 2024-12-01 | End: 2024-12-01

## 2024-12-01 RX ORDER — AMIODARONE HYDROCHLORIDE 200 MG/1
200 TABLET ORAL 2 TIMES DAILY
Status: DISCONTINUED | OUTPATIENT
Start: 2024-12-01 | End: 2024-12-01

## 2024-12-01 RX ADMIN — SODIUM CHLORIDE, PRESERVATIVE FREE 10 ML: 5 INJECTION INTRAVENOUS at 08:58

## 2024-12-01 RX ADMIN — CEPHALEXIN 1000 MG: 500 CAPSULE ORAL at 22:11

## 2024-12-01 RX ADMIN — SODIUM CHLORIDE, PRESERVATIVE FREE 10 ML: 5 INJECTION INTRAVENOUS at 20:22

## 2024-12-01 RX ADMIN — DILTIAZEM HYDROCHLORIDE 120 MG: 120 CAPSULE, COATED, EXTENDED RELEASE ORAL at 08:57

## 2024-12-01 RX ADMIN — INSULIN LISPRO 1 UNITS: 100 INJECTION, SOLUTION INTRAVENOUS; SUBCUTANEOUS at 20:21

## 2024-12-01 RX ADMIN — DEXTROSE MONOHYDRATE 150 MG: 50 INJECTION, SOLUTION INTRAVENOUS at 17:44

## 2024-12-01 RX ADMIN — METOPROLOL SUCCINATE 100 MG: 100 TABLET, EXTENDED RELEASE ORAL at 18:49

## 2024-12-01 RX ADMIN — APIXABAN 2.5 MG: 2.5 TABLET, FILM COATED ORAL at 20:21

## 2024-12-01 RX ADMIN — Medication 6 MG: at 22:11

## 2024-12-01 RX ADMIN — METOPROLOL SUCCINATE 75 MG: 50 TABLET, EXTENDED RELEASE ORAL at 08:57

## 2024-12-01 RX ADMIN — ENOXAPARIN SODIUM 40 MG: 100 INJECTION SUBCUTANEOUS at 08:57

## 2024-12-01 RX ADMIN — CEPHALEXIN 1000 MG: 500 CAPSULE ORAL at 13:54

## 2024-12-01 RX ADMIN — CLOPIDOGREL BISULFATE 75 MG: 75 TABLET ORAL at 08:57

## 2024-12-01 ASSESSMENT — PAIN SCALES - GENERAL: PAINLEVEL_OUTOF10: 0

## 2024-12-01 NOTE — PROGRESS NOTES
Controlled type 2 diabetes mellitus without complication (HCC)       Allergies:  No Known Allergies    Current Medications:  Current Facility-Administered Medications   Medication Dose Route Frequency Provider Last Rate Last Admin    apixaban (ELIQUIS) tablet 2.5 mg  2.5 mg Oral BID Love Ohara J, DO        sodium chloride flush 0.9 % injection 5-40 mL  5-40 mL IntraVENous 2 times per day Blu Montoya, DPM   10 mL at 12/01/24 0858    sodium chloride flush 0.9 % injection 5-40 mL  5-40 mL IntraVENous PRN PayalaJasel, DPM        0.9 % sodium chloride infusion   IntraVENous PRN Payala Blu, DPM        ondansetron (ZOFRAN-ODT) disintegrating tablet 4 mg  4 mg Oral Q8H PRN Blu Montoya, DPM        Or    ondansetron (ZOFRAN) injection 4 mg  4 mg IntraVENous Q6H PRN Jas Montoyael, DPM        glucose chewable tablet 16 g  4 tablet Oral PRN Hric, Anson, DO        dextrose bolus 10% 125 mL  125 mL IntraVENous PRN Hric, Anson, DO        Or    dextrose bolus 10% 250 mL  250 mL IntraVENous PRN Hric, Anson, DO        glucagon injection 1 mg  1 mg SubCUTAneous PRN Hric, Anson, DO        dextrose 10 % infusion   IntraVENous Continuous PRN Hric, Anson, DO        insulin lispro (HUMALOG,ADMELOG) injection vial 0-4 Units  0-4 Units SubCUTAneous 4x Daily AC & HS Hric, Anson, DO   1 Units at 11/30/24 1603    melatonin tablet 6 mg  6 mg Oral Nightly PRN Jen Herrera, APRN - CNP   6 mg at 11/30/24 2347    metoprolol succinate (TOPROL XL) extended release tablet 75 mg  75 mg Oral BID Srinivas Rojas MD   75 mg at 12/01/24 0857    dilTIAZem (CARDIZEM CD) extended release capsule 120 mg  120 mg Oral Daily Srinivas Rojas MD   120 mg at 12/01/24 0857    sodium chloride flush 0.9 % injection 5-40 mL  5-40 mL IntraVENous 2 times per day Emily Nascimento MD   10 mL at 11/30/24 2039    sodium chloride flush 0.9 % injection 5-40 mL  5-40 mL IntraVENous PRN Emily Nascimento MD        0.9 % sodium chloride  Tests:  Recent Labs     11/29/24 0330 11/30/24 0256 12/01/24 0421    134 137   K 3.6 4.0 4.1    100 104   CO2 22 24 24   BUN 15 18 17   CREATININE 1.1 1.2 1.1   GLUCOSE 140* 153* 121*   CALCIUM 8.7 9.0 9.0     Lab Results   Component Value Date/Time    MG 2.2 11/27/2024 05:21 AM     No results for input(s): \"ALKPHOS\", \"ALT\", \"AST\", \"BILITOT\", \"BILIDIR\", \"LABALBU\" in the last 72 hours.    Invalid input(s): \"PROT\"    Recent Labs     11/29/24 0330 11/30/24 0256 12/01/24 0421   WBC 8.3 7.4 6.5   RBC 4.03 4.11 4.07   HGB 12.4* 12.7 12.5   HCT 36.9* 37.6 37.3   MCV 91.6 91.5 91.6   MCH 30.8 30.9 30.7   MCHC 33.6 33.8 33.5   RDW 13.3 13.4 13.4    168 159   MPV 10.4 10.3 10.3     Lab Results   Component Value Date    TROPONINI < 0.03 05/23/2023    TROPONINI < 0.03 05/22/2023     No results found for: \"INR\", \"PROTIME\"  Lab Results   Component Value Date    TSH 3.58 05/23/2023     Lab Results   Component Value Date    LABA1C 8.8 (H) 11/30/2024     No results found for: \"EAG\"  Lab Results   Component Value Date    CHOL 133 (L) 05/23/2023     Lab Results   Component Value Date    TRIG 56 05/23/2023     Lab Results   Component Value Date    HDL 29 05/23/2023     No components found for: \"LDLCALC\", \"LDLCHOLESTEROL\"  No results found for: \"VLDL\"  No results found for: \"CHOLHDLRATIO\"    Cardiac Tests:  ECG: Atrial fibrillation with left anterior fascicular block, T wave abnormality consider lateral wall ischemia, prolonged QT interval, abnormal EKG.    Telemetry findings reviewed: Atrial fibrillation with a rate in the 110s, no other arrhythmias overnight.    Vitals and labs were reviewed: As above, blood pressure 117/90 heart rate 120 sats 100 % on room air    Labs bun/creatinine 16/1>> 17/1.3>> 18/1.2 potassium 3.9, liver function normal CBC normal.    Echocardiogram limited-11/26/2024:     Left Ventricle: The left ventricle is normal in size with normal thickness of endocardial surfaces.  No regional

## 2024-12-01 NOTE — DISCHARGE INSTRUCTIONS
Dressing changes at home - xeroform, 4x4 gauze, wrap with kerlix - change every other day until seen in office next week     Please call to schedule post op appt with Dr. Mccrary at one of the following offices:     [place] Office:  [facility]   [address] [place] [address] [phone]    [place] Office:  [facility]  [address] [place] [address]  [phone]

## 2024-12-01 NOTE — DISCHARGE SUMMARY
[facility] Hospitalist Physician Discharge Summary       Jose Mccrary, DPM  [address] [place] [id number] [phone]    Call in 1 day(s)  Post Op      Activity level: As tolerated     Dispo: Home      Condition on discharge: Stable     Patient ID:  Marcello Edwards  [id number]  89 y.o.  6/18/1930    Admit date: 11/27/2024    Discharge date and time:  12/2/24  1:22 PM    Admission Diagnoses: Principal Problem:    Cellulitis of right toe  Active Problems:    Necrosis of toe (HCC)    Controlled type 2 diabetes mellitus without complication (HCC)  Resolved Problems:    * No resolved hospital problems. *      Discharge Diagnoses: Principal Problem:    Cellulitis of right toe  Active Problems:    Necrosis of toe (HCC)    Controlled type 2 diabetes mellitus without complication (HCC)  Resolved Problems:    * No resolved hospital problems. *      Consults:  IP CONSULT TO INTERNAL MEDICINE  IP CONSULT TO CARDIOLOGY    Procedures:   Amputation right 5th toe through the level of metatarsophalangeal joint.   Balloon angioplasty to the superficial femoral artery, popliteal artery, right tibial artery, right peroneal artery.    Hospital Course:   Patient Marcello Edwards is a 89 y.o. presented with Cellulitis of right toe [L03.031]    This is a 89-year-old male who presented to the hospital with right necrotic fifth toe.  Patient was seen by vascular surgery and transported downtown for his PAD.  Patient received a balloon angioplasty to the superficial femoral artery, popliteal artery, right tibial artery, right peroneal artery.  Patient patient was started on Plavix 75 mg oral daily.  His Eliquis has been held for further podiatry procedures.  Patient then did have amputation of his right fifth toe through the level of the metatarsophalangeal joint.  Eliquis has now been resumed.  Patient is medically stable for discharge.    Discharge Exam:    General Appearance: alert and oriented to person, place and time  Plavix load 300 mg now, 75 mg daily - Follow-up with me in 2 weeks for groin check - Has been optimized from a vascular standpoint, okay to proceed with amputation by podiatry.  As a side note, perfusion to the toes is poor, would not be surprised if he requires a higher level amputation.  The pedal arch is not complete despite having the dorsal vessels and plantar vessels patent. Electronically signed by Kasey Del Toro MD on 11/27/2024 at 1:49 PM      Patient Instructions:      Medication List        START taking these medications      calcium carbonate 500 MG chewable tablet  Commonly known as: TUMS  Take 1 tablet by mouth 3 times daily as needed for Heartburn     cephALEXin 500 MG capsule  Commonly known as: KEFLEX  Take 2 capsules by mouth every 8 hours for 16 doses     clopidogrel 75 MG tablet  Commonly known as: PLAVIX  Take 1 tablet by mouth daily  Start taking on: December 2, 2024     dilTIAZem 120 MG extended release capsule  Commonly known as: CARDIZEM CD  Take 1 capsule by mouth daily  Start taking on: December 2, 2024            CHANGE how you take these medications      metoprolol succinate 25 MG extended release tablet  Commonly known as: TOPROL XL  Take 3 tablets by mouth 2 times daily  What changed:   medication strength  how much to take  when to take this            CONTINUE taking these medications      apixaban 2.5 MG Tabs tablet  Commonly known as: ELIQUIS     multivitamin Tabs tablet     TYLENOL PM EXTRA STRENGTH PO            STOP taking these medications      dilTIAZem 60 MG extended release capsule  Commonly known as: CARDIZEM 12 HR               Where to Get Your Medications        These medications were sent to [facility] [place] [address] [facility] - P [phone] - F [phone]  [address] [facility] [address]      Phone: [phone]   calcium carbonate 500 MG chewable tablet  cephALEXin 500 MG capsule  clopidogrel 75 MG tablet  dilTIAZem 120 MG extended release

## 2024-12-01 NOTE — PROGRESS NOTES
Department of Podiatry  Progress Note      SUBJECTIVE:  Marcello Edwards is seen at bedside for infected right fifth toe wound s/p 5th toe amputation 11/30/24.  No acute events overnight. Patient denies any N/V/D/F/C/SOB/CP. Denies any pain to surgical area. No other pedal complaints at this time. Daughter present in room.     OBJECTIVE:    Scheduled Meds:   sodium chloride flush  5-40 mL IntraVENous 2 times per day    insulin lispro  0-4 Units SubCUTAneous 4x Daily AC & HS    metoprolol succinate  75 mg Oral BID    apixaban  5 mg Oral BID    dilTIAZem  120 mg Oral Daily    sodium chloride flush  5-40 mL IntraVENous 2 times per day    cephALEXin  1,000 mg Oral 3 times per day    clopidogrel  75 mg Oral Daily     Continuous Infusions:   sodium chloride      dextrose      sodium chloride       PRN Meds:.sodium chloride flush, sodium chloride, ondansetron **OR** ondansetron, glucose, dextrose bolus **OR** dextrose bolus, glucagon (rDNA), dextrose, melatonin, sodium chloride flush, sodium chloride, polyethylene glycol, acetaminophen **OR** acetaminophen, perflutren lipid microspheres, calcium carbonate    No Known Allergies    /65   Pulse (!) 121   Temp 98 °F (36.7 °C) (Oral)   Resp 16   Ht 1.803 m (5' 11\")   Wt 80 kg (176 lb 4.8 oz)   SpO2 96%   BMI 24.59 kg/m²       EXAM:    VASCULAR:  Right DP and PT pulses are faintly palpable. CFT < 5 seconds.  Warm to warm from the tibial tuberosity to the distal aspect of the digits dorsally. Hair growth is not noted to the distal aspects dorsally.    NEUROLOGIC:  light touch diminished    MUSCULOSKELETAL: s/p right 5th toe amputation    DERM: Surgical incision noted to lateral right foot is well approximated. Small amount of dehiscence noted mid incision. Erythema noted to distal lateral  right foot. No drainage, no increase in warmth.               Scheduled Meds:   sodium chloride flush  5-40 mL IntraVENous 2 times per day    insulin lispro  0-4 Units SubCUTAneous 4x

## 2024-12-01 NOTE — PLAN OF CARE
Problem: Chronic Conditions and Co-morbidities  Goal: Patient's chronic conditions and co-morbidity symptoms are monitored and maintained or improved  11/30/2024 1948 by Bubba Le RN  Outcome: Progressing  11/30/2024 1012 by Josephine Paulino RN  Outcome: Progressing     Problem: Discharge Planning  Goal: Discharge to home or other facility with appropriate resources  11/30/2024 1948 by Bubba Le, RN  Outcome: Progressing  11/30/2024 1012 by Josephine Paulino RN  Outcome: Progressing     Problem: Safety - Adult  Goal: Free from fall injury  11/30/2024 1948 by Bubba Le, RN  Outcome: Progressing  11/30/2024 1012 by Josephine Paulino RN  Outcome: Progressing

## 2024-12-02 VITALS
OXYGEN SATURATION: 99 % | WEIGHT: 176.3 LBS | RESPIRATION RATE: 17 BRPM | TEMPERATURE: 97.5 F | HEIGHT: 71 IN | SYSTOLIC BLOOD PRESSURE: 121 MMHG | BODY MASS INDEX: 24.68 KG/M2 | HEART RATE: 115 BPM | DIASTOLIC BLOOD PRESSURE: 80 MMHG

## 2024-12-02 LAB
ANION GAP SERPL CALCULATED.3IONS-SCNC: 10 MMOL/L (ref 7–16)
BASOPHILS # BLD: 0.05 K/UL (ref 0–0.2)
BASOPHILS NFR BLD: 1 % (ref 0–2)
BUN SERPL-MCNC: 15 MG/DL (ref 6–23)
CALCIUM SERPL-MCNC: 8.9 MG/DL (ref 8.6–10.2)
CHLORIDE SERPL-SCNC: 102 MMOL/L (ref 98–107)
CO2 SERPL-SCNC: 22 MMOL/L (ref 22–29)
CREAT SERPL-MCNC: 1 MG/DL (ref 0.7–1.2)
EOSINOPHIL # BLD: 0.15 K/UL (ref 0.05–0.5)
EOSINOPHILS RELATIVE PERCENT: 2 % (ref 0–6)
ERYTHROCYTE [DISTWIDTH] IN BLOOD BY AUTOMATED COUNT: 13.3 % (ref 11.5–15)
GFR, ESTIMATED: 71 ML/MIN/1.73M2
GLUCOSE BLD-MCNC: 102 MG/DL (ref 74–99)
GLUCOSE BLD-MCNC: 116 MG/DL (ref 74–99)
GLUCOSE BLD-MCNC: 136 MG/DL (ref 74–99)
GLUCOSE SERPL-MCNC: 186 MG/DL (ref 74–99)
HCT VFR BLD AUTO: 40.7 % (ref 37–54)
HGB BLD-MCNC: 13.4 G/DL (ref 12.5–16.5)
IMM GRANULOCYTES # BLD AUTO: 0.03 K/UL (ref 0–0.58)
IMM GRANULOCYTES NFR BLD: 0 % (ref 0–5)
LYMPHOCYTES NFR BLD: 0.7 K/UL (ref 1.5–4)
LYMPHOCYTES RELATIVE PERCENT: 10 % (ref 20–42)
MCH RBC QN AUTO: 30.6 PG (ref 26–35)
MCHC RBC AUTO-ENTMCNC: 32.9 G/DL (ref 32–34.5)
MCV RBC AUTO: 92.9 FL (ref 80–99.9)
MONOCYTES NFR BLD: 0.46 K/UL (ref 0.1–0.95)
MONOCYTES NFR BLD: 7 % (ref 2–12)
NEUTROPHILS NFR BLD: 80 % (ref 43–80)
NEUTS SEG NFR BLD: 5.45 K/UL (ref 1.8–7.3)
PLATELET # BLD AUTO: 169 K/UL (ref 130–450)
PMV BLD AUTO: 10.1 FL (ref 7–12)
POTASSIUM SERPL-SCNC: 4.4 MMOL/L (ref 3.5–5)
RBC # BLD AUTO: 4.38 M/UL (ref 3.8–5.8)
SODIUM SERPL-SCNC: 134 MMOL/L (ref 132–146)
WBC OTHER # BLD: 6.8 K/UL (ref 4.5–11.5)

## 2024-12-02 PROCEDURE — 99239 HOSP IP/OBS DSCHRG MGMT >30: CPT | Performed by: INTERNAL MEDICINE

## 2024-12-02 PROCEDURE — 6370000000 HC RX 637 (ALT 250 FOR IP): Performed by: STUDENT IN AN ORGANIZED HEALTH CARE EDUCATION/TRAINING PROGRAM

## 2024-12-02 PROCEDURE — 36415 COLL VENOUS BLD VENIPUNCTURE: CPT

## 2024-12-02 PROCEDURE — 6370000000 HC RX 637 (ALT 250 FOR IP): Performed by: INTERNAL MEDICINE

## 2024-12-02 PROCEDURE — 99233 SBSQ HOSP IP/OBS HIGH 50: CPT | Performed by: INTERNAL MEDICINE

## 2024-12-02 PROCEDURE — 2580000003 HC RX 258

## 2024-12-02 PROCEDURE — 85025 COMPLETE CBC W/AUTO DIFF WBC: CPT

## 2024-12-02 PROCEDURE — 93005 ELECTROCARDIOGRAM TRACING: CPT | Performed by: INTERNAL MEDICINE

## 2024-12-02 PROCEDURE — 80048 BASIC METABOLIC PNL TOTAL CA: CPT

## 2024-12-02 PROCEDURE — 82947 ASSAY GLUCOSE BLOOD QUANT: CPT

## 2024-12-02 RX ORDER — METOPROLOL SUCCINATE 100 MG/1
100 TABLET, EXTENDED RELEASE ORAL 2 TIMES DAILY
Qty: 30 TABLET | Refills: 3 | Status: SHIPPED | OUTPATIENT
Start: 2024-12-02

## 2024-12-02 RX ORDER — AMIODARONE HYDROCHLORIDE 200 MG/1
200 TABLET ORAL DAILY
Qty: 30 TABLET | Refills: 0 | Status: SHIPPED | OUTPATIENT
Start: 2024-12-17 | End: 2024-12-02 | Stop reason: HOSPADM

## 2024-12-02 RX ORDER — AMIODARONE HYDROCHLORIDE 200 MG/1
200 TABLET ORAL DAILY
Status: DISCONTINUED | OUTPATIENT
Start: 2024-12-17 | End: 2024-12-02

## 2024-12-02 RX ORDER — DILTIAZEM HYDROCHLORIDE 120 MG/1
120 CAPSULE, COATED, EXTENDED RELEASE ORAL DAILY
Qty: 30 CAPSULE | Refills: 3 | Status: SHIPPED | OUTPATIENT
Start: 2024-12-02

## 2024-12-02 RX ORDER — AMIODARONE HYDROCHLORIDE 200 MG/1
200 TABLET ORAL 2 TIMES DAILY
Status: DISCONTINUED | OUTPATIENT
Start: 2024-12-02 | End: 2024-12-02

## 2024-12-02 RX ORDER — DILTIAZEM HYDROCHLORIDE 120 MG/1
120 CAPSULE, COATED, EXTENDED RELEASE ORAL DAILY
Status: DISCONTINUED | OUTPATIENT
Start: 2024-12-02 | End: 2024-12-02 | Stop reason: HOSPADM

## 2024-12-02 RX ORDER — AMIODARONE HYDROCHLORIDE 200 MG/1
TABLET ORAL
Qty: 60 TABLET | Refills: 3 | Status: SHIPPED | OUTPATIENT
Start: 2024-12-02 | End: 2024-12-02 | Stop reason: HOSPADM

## 2024-12-02 RX ADMIN — DILTIAZEM HYDROCHLORIDE 120 MG: 120 CAPSULE, COATED, EXTENDED RELEASE ORAL at 16:50

## 2024-12-02 RX ADMIN — METOPROLOL SUCCINATE 100 MG: 100 TABLET, EXTENDED RELEASE ORAL at 08:56

## 2024-12-02 RX ADMIN — APIXABAN 5 MG: 5 TABLET, FILM COATED ORAL at 08:56

## 2024-12-02 RX ADMIN — SODIUM CHLORIDE, PRESERVATIVE FREE 10 ML: 5 INJECTION INTRAVENOUS at 09:07

## 2024-12-02 RX ADMIN — CEPHALEXIN 1000 MG: 500 CAPSULE ORAL at 14:31

## 2024-12-02 RX ADMIN — CEPHALEXIN 1000 MG: 500 CAPSULE ORAL at 06:03

## 2024-12-02 RX ADMIN — CLOPIDOGREL BISULFATE 75 MG: 75 TABLET ORAL at 08:56

## 2024-12-02 RX ADMIN — INSULIN LISPRO 0 UNITS: 100 INJECTION, SOLUTION INTRAVENOUS; SUBCUTANEOUS at 10:53

## 2024-12-02 ASSESSMENT — PAIN SCALES - GENERAL: PAINLEVEL_OUTOF10: 0

## 2024-12-02 NOTE — PLAN OF CARE
Problem: Chronic Conditions and Co-morbidities  Goal: Patient's chronic conditions and co-morbidity symptoms are monitored and maintained or improved  12/2/2024 1710 by Cee Venegas RN  Outcome: Completed  12/2/2024 1147 by Cee Venegas RN  Outcome: Progressing  12/2/2024 0618 by Bobbi Balbuena RN  Outcome: Progressing     Problem: Discharge Planning  Goal: Discharge to home or other facility with appropriate resources  12/2/2024 1710 by Cee Venegas RN  Outcome: Completed  12/2/2024 1147 by Cee Venegas RN  Outcome: Progressing  12/2/2024 0618 by Bobbi Balbuena RN  Outcome: Progressing     Problem: Safety - Adult  Goal: Free from fall injury  12/2/2024 1710 by Cee Venegas RN  Outcome: Completed  12/2/2024 1147 by Cee Venegas RN  Outcome: Progressing  12/2/2024 0618 by Bobbi Balbuena RN  Outcome: Adequate for Discharge     Problem: ABCDS Injury Assessment  Goal: Absence of physical injury  12/2/2024 1710 by Cee Venegas RN  Outcome: Completed  12/2/2024 1147 by Cee Venegas RN  Outcome: Progressing  12/2/2024 0618 by Bobbi Balbuena RN  Outcome: Adequate for Discharge

## 2024-12-02 NOTE — PLAN OF CARE
Problem: Chronic Conditions and Co-morbidities  Goal: Patient's chronic conditions and co-morbidity symptoms are monitored and maintained or improved  Outcome: Progressing     Problem: Discharge Planning  Goal: Discharge to home or other facility with appropriate resources  Outcome: Progressing     Problem: Safety - Adult  Goal: Free from fall injury  Outcome: Adequate for Discharge     Problem: ABCDS Injury Assessment  Goal: Absence of physical injury  Outcome: Adequate for Discharge

## 2024-12-02 NOTE — PLAN OF CARE
Problem: Chronic Conditions and Co-morbidities  Goal: Patient's chronic conditions and co-morbidity symptoms are monitored and maintained or improved  12/2/2024 1147 by Cee Venegas RN  Outcome: Progressing  12/2/2024 0618 by Bobbi Balbuena RN  Outcome: Progressing     Problem: Discharge Planning  Goal: Discharge to home or other facility with appropriate resources  12/2/2024 1147 by Cee Venegas RN  Outcome: Progressing  12/2/2024 0618 by Bobbi Balbuena RN  Outcome: Progressing     Problem: Safety - Adult  Goal: Free from fall injury  12/2/2024 1147 by Cee Venegas RN  Outcome: Progressing  12/2/2024 0618 by Bobbi Balbuena RN  Outcome: Adequate for Discharge     Problem: ABCDS Injury Assessment  Goal: Absence of physical injury  12/2/2024 1147 by Cee Venegas RN  Outcome: Progressing  12/2/2024 0618 by Bobbi Balbuena RN  Outcome: Adequate for Discharge

## 2024-12-02 NOTE — PROGRESS NOTES
Department of Podiatry  Progress Note      SUBJECTIVE:  Marcello Edwards is seen at bedside for infected right fifth toe wound s/p 5th toe amputation 11/30/24.  No acute events overnight. Patient denies any N/V/D/F/C/SOB/CP. Denies any pain to surgical area. No other pedal complaints at this time.    OBJECTIVE:    Scheduled Meds:   apixaban  5 mg Oral BID    metoprolol succinate  100 mg Oral BID    sodium chloride flush  5-40 mL IntraVENous 2 times per day    insulin lispro  0-4 Units SubCUTAneous 4x Daily AC & HS    sodium chloride flush  5-40 mL IntraVENous 2 times per day    cephALEXin  1,000 mg Oral 3 times per day    clopidogrel  75 mg Oral Daily     Continuous Infusions:   sodium chloride      dextrose      sodium chloride       PRN Meds:.sodium chloride flush, sodium chloride, ondansetron **OR** ondansetron, glucose, dextrose bolus **OR** dextrose bolus, glucagon (rDNA), dextrose, melatonin, sodium chloride flush, sodium chloride, polyethylene glycol, acetaminophen **OR** acetaminophen, perflutren lipid microspheres, calcium carbonate    No Known Allergies    /82   Pulse (!) 122   Temp 97.6 °F (36.4 °C) (Oral)   Resp 19   Ht 1.803 m (5' 11\")   Wt 80 kg (176 lb 4.8 oz)   SpO2 97%   BMI 24.59 kg/m²       EXAM:    VASCULAR:  Right DP and PT pulses are faintly palpable. CFT < 5 seconds.  Warm to warm from the tibial tuberosity to the distal aspect of the digits dorsally. Hair growth is not noted to the distal aspects dorsally.    NEUROLOGIC:  light touch diminished    MUSCULOSKELETAL: s/p right 5th toe amputation    DERM: Surgical incision noted to lateral right foot is well approximated. Small amount of dehiscence noted mid incision. Erythema noted to distal lateral  right foot. No drainage, no increase in warmth.               Scheduled Meds:   apixaban  5 mg Oral BID    metoprolol succinate  100 mg Oral BID    sodium chloride flush  5-40 mL IntraVENous 2 times per day    insulin lispro  0-4 Units  SubCUTAneous 4x Daily AC & HS    sodium chloride flush  5-40 mL IntraVENous 2 times per day    cephALEXin  1,000 mg Oral 3 times per day    clopidogrel  75 mg Oral Daily     Continuous Infusions:   sodium chloride      dextrose      sodium chloride       PRN Meds:.sodium chloride flush, sodium chloride, ondansetron **OR** ondansetron, glucose, dextrose bolus **OR** dextrose bolus, glucagon (rDNA), dextrose, melatonin, sodium chloride flush, sodium chloride, polyethylene glycol, acetaminophen **OR** acetaminophen, perflutren lipid microspheres, calcium carbonate    RADIOLOGY:  No orders to display     /82   Pulse (!) 122   Temp 97.6 °F (36.4 °C) (Oral)   Resp 19   Ht 1.803 m (5' 11\")   Wt 80 kg (176 lb 4.8 oz)   SpO2 97%   BMI 24.59 kg/m²     LABS:    Recent Labs     12/01/24  0421 12/02/24  0820   WBC 6.5 6.8   HGB 12.5 13.4   HCT 37.3 40.7    169        Recent Labs     12/02/24  0820      K 4.4      CO2 22   BUN 15   CREATININE 1.0      No results for input(s): \"INR\", \"APTT\" in the last 72 hours.    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 Labs, images and ancillary service notes reviewed.   - Pain Control: IV and PO  - Antibiotics per ID -appreciate recommendations  - Vascular: Angioplasty of peripheral artery with aortogram abdominal performed 11/27/2024.  Patient has been optimized from a vascular standpoint.  Vascular also notes that the pedal arch is not complete despite having the dorsal vessels and plantar vessels patent and that they would not be surprised if a higher level amputation is required.  - Cultures: Blood cultures negative  - XR Right foot taken 11/25/2024: Mildly displaced fracture at the proximal 5th metatarsal.   - Dressing: Dry sterile dressing

## 2024-12-02 NOTE — PROGRESS NOTES
[facility] Hospitalist Progress Note    Admitting Date and Time: 11/27/2024  5:13 PM  Admit Dx: Cellulitis of right toe [L03.031]    Subjective:  Patient is being followed for Cellulitis of right toe [L03.031]     Patient denies any complaints at this time    ROS: denies fever, chills, cp, sob, n/v, HA unless stated above.      apixaban  5 mg Oral BID    amiodarone  200 mg Oral BID    [START ON 12/17/2024] amiodarone  200 mg Oral Daily    metoprolol succinate  100 mg Oral BID    sodium chloride flush  5-40 mL IntraVENous 2 times per day    insulin lispro  0-4 Units SubCUTAneous 4x Daily AC & HS    sodium chloride flush  5-40 mL IntraVENous 2 times per day    cephALEXin  1,000 mg Oral 3 times per day    clopidogrel  75 mg Oral Daily     sodium chloride flush, 5-40 mL, PRN  sodium chloride, , PRN  ondansetron, 4 mg, Q8H PRN   Or  ondansetron, 4 mg, Q6H PRN  glucose, 4 tablet, PRN  dextrose bolus, 125 mL, PRN   Or  dextrose bolus, 250 mL, PRN  glucagon (rDNA), 1 mg, PRN  dextrose, , Continuous PRN  melatonin, 6 mg, Nightly PRN  sodium chloride flush, 5-40 mL, PRN  sodium chloride, , PRN  polyethylene glycol, 17 g, Daily PRN  acetaminophen, 650 mg, Q6H PRN   Or  acetaminophen, 650 mg, Q6H PRN  perflutren lipid microspheres, 1.5 mL, ONCE PRN  calcium carbonate, 500 mg, TID PRN         Objective:    /80   Pulse (!) 115   Temp 97.5 °F (36.4 °C) (Oral)   Resp 17   Ht 1.803 m (5' 11\")   Wt 80 kg (176 lb 4.8 oz)   SpO2 99%   BMI 24.59 kg/m²     General Appearance: alert and oriented to person, place and time and in no acute distress  Skin: warm and dry  Head: normocephalic and atraumatic  Eyes: pupils equal, round, extraocular eye movements intact, conjunctivae normal  Pulmonary/Chest: clear to auscultation bilaterally- no wheezes, rales or rhonchi, normal air movement, no respiratory distress  Cardiovascular: Irregularly irregular  Abdomen: soft, non-tender, non-distended, normal bowel sounds,

## 2024-12-02 NOTE — CARE COORDINATION
Cardiology. Vascular Surgery, Podiatry are following. Discharge plan is home when medically stable, patient pleasantly declines [facility]. Patients daughter, Brionna will transport home upon discharge.  Zoey NGON, RN  Case Management

## 2024-12-02 NOTE — PROGRESS NOTES
Component Value Date/Time    MG 2.2 11/27/2024 05:21 AM     Cardiac Enzymes: No results found for: \"CKTOTAL\", \"CKMB\", \"CKMBINDEX\", \"TROPHS\"   PT/INR:  No results found for: \"PROTIME\", \"INR\"  TSH:    Lab Results   Component Value Date/Time    TSH 6.39 12/01/2024 06:00 PM     Rhythm Strip: atrial flutter. Pause noted 3.5 seconds    EKG today: Atrial flutter with 's    Echo limited 11/26/2024:     Left Ventricle: The left ventricle is normal in size with normal thickness of endocardial surfaces.  No regional wall motion abnormalities are identified with overall normal left ventricular systolic function.  An estimated ejection fraction of 55-60% is calculated.  Indeterminate diastolic function is present on the basis of atrial flutter.    Aortic Valve: Aortic morphology is suboptimally visualized with calcification of the noncoronary cusp and no evidence of aortic stenosis.    Image quality is adequate.    ASSESSMENT & PLAN:    Patient Active Problem List   Diagnosis    Essential hypertension    CKD (chronic kidney disease) stage 3, GFR 30-59 ml/min ([facility])    Thrombocytopenia ([facility])    Diabetes mellitus type 2, noninsulin dependent ([facility])    Atrial flutter ([facility])    Cellulitis of right foot    Cellulitis of fifth toe of right foot    Urinary retention    Primary hypertension    Atrial fibrillation ([facility])    Displaced fracture of fifth metatarsal bone, right foot, initial encounter for closed fracture    Cardiomyopathy ([facility])    Type 2 diabetes mellitus with foot ulcer, without long-term current use of insulin ([facility])    Pure hypercholesterolemia    Preoperative cardiovascular examination    Cellulitis of right toe    Necrosis of toe ([facility])    Controlled type 2 diabetes mellitus without complication ([facility])     1. Perioperative cardiac evaluation:     Tolerated toe amputation through metatarsophalangeal joint on 11/30/2024.    2. Persistent Afib:     Rate control with PO BB. Add Cardizem CD. Did not tolerate amio.      [id number] score of at least 5. Eliquis.     3. HTN: Observe.    4. Lipids: Statin.    5. DM: Per primary service.     6. CKD: Follow labs.     7. PAD: Hx of PCI/angioplasty to right superficial femoral artery, popliteal artery balloon angioplasty, right anterior tibial balloon angioplasty 11/27/2024.    Plavix/statin.     Available external charts reviewed.   Available imaging and evaluations independently reviewed.   Interviewed and discussed patient with available family.  Discussed case with referring service and non-cardiology consultants.     Greater than 50 minutes was spent counseling the patient, reviewing the rationale for the above recommendations and reviewing the patient's current medication list, problem list and results of all previously ordered testing.    Lisseth Rojo D.O.  Cardiologist  Cardiology, [facility]

## 2024-12-03 ENCOUNTER — CARE COORDINATION (OUTPATIENT)
Dept: CARE COORDINATION | Age: 89
End: 2024-12-03

## 2024-12-03 DIAGNOSIS — L03.031 CELLULITIS OF RIGHT TOE: Primary | ICD-10-CM

## 2024-12-03 LAB
EKG ATRIAL RATE: 124 BPM
EKG P-R INTERVAL: 152 MS
EKG Q-T INTERVAL: 326 MS
EKG QRS DURATION: 94 MS
EKG QTC CALCULATION (BAZETT): 468 MS
EKG R AXIS: -62 DEGREES
EKG T AXIS: 115 DEGREES
EKG VENTRICULAR RATE: 124 BPM

## 2024-12-03 PROCEDURE — 93010 ELECTROCARDIOGRAM REPORT: CPT | Performed by: INTERNAL MEDICINE

## 2024-12-03 NOTE — CARE COORDINATION
Care Transitions Note    Initial Call - Call within 2 business days of discharge: Yes    Patient Current Location:  Home: [address] [place] [address]    Care Transition Nurse contacted the patient by telephone to perform post hospital discharge assessment, verified name and  as identifiers. Provided introduction to self, and explanation of the Care Transition Nurse role.     Patient: Marcello Edwards      Patient : 1930   MRN: [id number]      Reason for Admission: 2024 - 2024 [facility] IP. s/p RIGHT FOOT FIFTH TOE AMPUTATION   Discharge Date: 24  RURS: Readmission Risk Score: 11.7    Declined [facility].    Post-op/Katey  1:30    START taking:  calcium carbonate (TUMS)  cephALEXin (KEFLEX)  clopidogrel (PLAVIX)  dilTIAZem (CARDIZEM CD)  CHANGE how you take:  apixaban (ELIQUIS)  metoprolol succinate (TOPROL XL)  STOP taking:  dilTIAZem 60 MG extended release capsule (CARDIZEM  12 HR)        Last Discharge Facility       Date Complaint Diagnosis Description Type Department Provider    24  Cellulitis of fifth toe of right foot Admission (Discharged) [facility],     24  Peripheral vascular disease, unspecified (HCC) Admission (Discharged) Kasey Gross MD            Was this an external facility discharge? No    Additional needs identified to be addressed with provider   No needs identified             Method of communication with provider: none.    Patients top risk factors for readmission: A1C 24 8.8, Toe amp    Interventions to address risk factors:   Elevate leg at rest.   Use ice for reduced swelling and comfort.  Use your walk aide for ambulation.  Prevent toe/foot strike or injury. Wear your surgical shoe.  Complete full course of Keflex.  Dressing changes at home - xeroform, 4x4 gauze, wrap with kerlix - change every other day until seen in office next week.  Avoid carbs in fluids. Eat diabetic healthy diet and limit

## 2024-12-05 ENCOUNTER — TELEPHONE (OUTPATIENT)
Dept: PRIMARY CARE CLINIC | Age: 89
End: 2024-12-05

## 2024-12-05 LAB — SURGICAL PATHOLOGY REPORT: NORMAL

## 2024-12-11 ENCOUNTER — CARE COORDINATION (OUTPATIENT)
Dept: CARE COORDINATION | Age: 89
End: 2024-12-11

## 2024-12-11 NOTE — CARE COORDINATION
Care Transitions Note    Follow Up Call     Attempted to reach patient for transitions of care follow up.  Unable to reach patient.      Outreach Attempts:   HIPAA compliant voicemail left for patient.     Care Summary Note: Unable to reach for transitions follow up call. Left VM to home and mobile numbers requesting call back.     Follow Up Appointment:   Future Appointments         Provider Specialty Dept Phone    12/16/2024 1:30 PM Kasey Del Toro MD Vascular Surgery [phone]    9/15/2025 10:00 AM Eliseo Mondragon MD Cardiology [phone]            Plan for follow-up call in 6-10 days based on severity of symptoms and risk factors. Plan for next call: CT FU, Symptom check, Pain check, wound concerns? Make PCP appt? LPN call.     Marylu Cavazos RN

## 2024-12-16 ENCOUNTER — OFFICE VISIT (OUTPATIENT)
Dept: VASCULAR SURGERY | Age: 89
End: 2024-12-16
Payer: MEDICARE

## 2024-12-16 DIAGNOSIS — I73.9 PAD (PERIPHERAL ARTERY DISEASE) (HCC): Primary | ICD-10-CM

## 2024-12-16 PROCEDURE — 99213 OFFICE O/P EST LOW 20 MIN: CPT | Performed by: STUDENT IN AN ORGANIZED HEALTH CARE EDUCATION/TRAINING PROGRAM

## 2024-12-16 PROCEDURE — 1123F ACP DISCUSS/DSCN MKR DOCD: CPT | Performed by: STUDENT IN AN ORGANIZED HEALTH CARE EDUCATION/TRAINING PROGRAM

## 2024-12-16 PROCEDURE — 1159F MED LIST DOCD IN RCRD: CPT | Performed by: STUDENT IN AN ORGANIZED HEALTH CARE EDUCATION/TRAINING PROGRAM

## 2024-12-16 RX ORDER — CLOPIDOGREL BISULFATE 75 MG/1
75 TABLET ORAL DAILY
Qty: 30 TABLET | Refills: 3 | Status: SHIPPED | OUTPATIENT
Start: 2024-12-16

## 2024-12-16 RX ORDER — ROSUVASTATIN CALCIUM 5 MG/1
5 TABLET, COATED ORAL NIGHTLY
Qty: 30 TABLET | Refills: 3 | Status: SHIPPED | OUTPATIENT
Start: 2024-12-16

## 2024-12-16 NOTE — PROGRESS NOTES
and ear canal normal bilaterally, nose without deformity, no carotid bruits  Pulmonary/Chest: normal air movement, no respiratory distress  Cardiovascular: Regular rate and rhythm  Abdomen: non-distended, no masses  Musculoskeletal: no joint deformity or tenderness  Extremities: no leg edema bilaterally.  No groin hematoma.  Skin: warm and dry, no rash or erythema    PULSE EXAM      Right      Left   Brachial     Radial     Femoral 3 3   Popliteal 3    Dorsalis Pedis     Posterior Tibial     (3=normal, 2=diminished, 1=barely palpable, 4=widened)    RADIOLOGY: [facility] today  Reviewed angiogram images    Problem List Items Addressed This Visit    None      # PAD    89-year-old male with tissue loss right lower extremity now status post right femoropopliteal and AT and peroneal angioplasty, subsequent ray amputation.  Doing well, healing nicely, being followed by podiatry.  Groin is fine.  Will have him follow-up in 6 months with [facility].  Prescribed Crestor as well as Plavix.  Advised him if he is suffering from myalgias to stop taking Crestor.      No follow-ups on file.

## 2024-12-19 ENCOUNTER — CARE COORDINATION (OUTPATIENT)
Dept: CARE COORDINATION | Age: 89
End: 2024-12-19

## 2024-12-19 NOTE — CARE COORDINATION
Care Transitions Note    Follow Up Call     Reason for Admission: 2024 - 20[facility] s/p RIGHT FOOT FIFTH TOE AMPUTATION     Patient Current Location:  Home: [address] [place], [place] [id number]    Care Transition Nurse contacted the patient by telephone. Verified name and  as identifiers.    Additional needs identified to be addressed with provider   No needs identified                 Method of communication with provider: none.    Care Summary Note: CTN spoke w/ Marcello. States he has wound care nurse. Saw [facility]. States he had stitches out and incision healing well. No acute pain concerns. He has no ambulation concerns and feels he is stepping well. Completing Keflex therapy. No symptom concerns. Will have next fu in 3 wks. Has/taking meds as directed. No appetite or elimination concerns. In good spirits.  Declines need for further telephonic follow up. CTN s/o.    Assessments:  Care Transitions Subsequent and Final Call    Subsequent and Final Calls  Do you have any ongoing symptoms?: No  Do you have any questions related to your medications?: No  Do you currently have any active services?: Yes  Are you currently active with any services?: Home Health  Do you have any needs or concerns that I can assist you with?: No  Identified Barriers: Lack of Education  Care Transitions Interventions  Other Interventions:              Follow Up Appointment:     Future Appointments         Provider Specialty Dept Phone    2025 1:30 PM LÓPEZ EUGENIA KANDI  1 Cardiology [phone]    2025 2:00 PM Kasey Del Toro MD Vascular Surgery [phone]    9/15/2025 10:00 AM Eliseo Mondragon MD Cardiology [phone]          Priscila Lazaro RN

## 2024-12-26 PROBLEM — Z01.810 PREOPERATIVE CARDIOVASCULAR EXAMINATION: Status: RESOLVED | Noted: 2024-11-26 | Resolved: 2024-12-26

## 2025-06-28 NOTE — PROGRESS NOTES
Vascular Surgery Outpatient Progress Note      No chief complaint on file.      HISTORY OF PRESENT ILLNESS:                The patient is a 89 y.o. male who returns for follow-up evaluation of peripheral arterial disease.  I have performed right AT, peroneal, femoropopliteal segment angioplasty on November 27, 2024.  He underwent fifth ray amputation with Dr. Mccrary podiatry.  He is healed nicely from this.  He is here today for interval follow-up.  He stopped taking his Plavix after his prescription ran out.  He remains on Eliquis.  He has healed his right fifth digit amputation site completely.  He does have a shin wound which he says was there for about a week, he says that he sustained minor trauma from bumping into something.    Past Medical History:        Diagnosis Date    A-fib (HCC)     Hx of colonoscopy 10/01/2010    Dr.Sayed Mcnally-NEGATIVE for polyps    Hypertension     Paget's disease of bone     evaluated by endocrine in [facility]    Urinary retention with incomplete bladder emptying     self caths     Past Surgical History:        Procedure Laterality Date    BLADDER STONE REMOVAL       -cystoscopy    CATARACT REMOVAL WITH IMPLANT Bilateral     local opthalmology    HERNIA REPAIR      INVASIVE VASCULAR N/A 11/27/2024    Aortagram abdominal performed by Kasey Del Toro MD at [facility]    INVASIVE VASCULAR N/A 11/27/2024    Angioplasty peripheral artery performed by Kasey Del Toro MD at [facility]    TOE AMPUTATION Right 11/30/2024    RIGHT FOOT FIFTH TOE AMPUTATION performed by Jose Mccrary DPM at [facility] OR    TOTAL KNEE ARTHROPLASTY Left     approx. 2018     Current Medications:   Prior to Admission medications    Medication Sig Start Date End Date Taking? Authorizing Provider   clopidogrel (PLAVIX) 75 MG tablet Take 1 tablet by mouth daily 12/16/24   Kasey Del Toro MD   rosuvastatin (CRESTOR) 5 MG tablet Take 1 tablet by mouth nightly 12/16/24   Kasey Del Toro MD

## 2025-06-30 ENCOUNTER — OFFICE VISIT (OUTPATIENT)
Dept: VASCULAR SURGERY | Age: 89
End: 2025-06-30
Payer: MEDICARE

## 2025-06-30 ENCOUNTER — HOSPITAL ENCOUNTER (OUTPATIENT)
Dept: CARDIOLOGY | Age: 89
Discharge: HOME | End: 2025-07-02
Payer: MEDICARE

## 2025-06-30 VITALS — BODY MASS INDEX: 23.57 KG/M2 | WEIGHT: 169 LBS

## 2025-06-30 DIAGNOSIS — I73.9 PAD (PERIPHERAL ARTERY DISEASE): ICD-10-CM

## 2025-06-30 DIAGNOSIS — I73.9 PAD (PERIPHERAL ARTERY DISEASE): Primary | ICD-10-CM

## 2025-06-30 PROCEDURE — 99214 OFFICE O/P EST MOD 30 MIN: CPT | Performed by: STUDENT IN AN ORGANIZED HEALTH CARE EDUCATION/TRAINING PROGRAM

## 2025-06-30 PROCEDURE — 1159F MED LIST DOCD IN RCRD: CPT | Performed by: STUDENT IN AN ORGANIZED HEALTH CARE EDUCATION/TRAINING PROGRAM

## 2025-06-30 PROCEDURE — 1123F ACP DISCUSS/DSCN MKR DOCD: CPT | Performed by: STUDENT IN AN ORGANIZED HEALTH CARE EDUCATION/TRAINING PROGRAM

## 2025-06-30 PROCEDURE — G2211 COMPLEX E/M VISIT ADD ON: HCPCS | Performed by: STUDENT IN AN ORGANIZED HEALTH CARE EDUCATION/TRAINING PROGRAM

## 2025-06-30 PROCEDURE — 93923 UPR/LXTR ART STDY 3+ LVLS: CPT

## 2025-07-01 LAB
VAS LEFT ABI: 0.97
VAS LEFT ARM BP: 131 MMHG
VAS LEFT DORSALIS PEDIS BP: 116 MMHG
VAS LEFT PTA BP: 127 MMHG
VAS LEFT TBI: 0.79
VAS LEFT TOE PRESSURE: 103 MMHG
VAS RIGHT ABI: 1.02
VAS RIGHT ARM BP: 124 MMHG
VAS RIGHT DORSALIS PEDIS BP: 134 MMHG
VAS RIGHT PTA BP: 130 MMHG
VAS RIGHT TBI: 0.68
VAS RIGHT TOE PRESSURE: 89 MMHG

## 2025-07-01 PROCEDURE — 93923 UPR/LXTR ART STDY 3+ LVLS: CPT | Performed by: STUDENT IN AN ORGANIZED HEALTH CARE EDUCATION/TRAINING PROGRAM

## 2025-08-05 RX ORDER — CLOPIDOGREL BISULFATE 75 MG/1
75 TABLET ORAL DAILY
Qty: 90 TABLET | Refills: 3 | Status: SHIPPED | OUTPATIENT
Start: 2025-08-05

## (undated) DEVICE — ANGIO-SEAL VIP VASCULAR CLOSURE DEVICE: Brand: ANGIO-SEAL

## (undated) DEVICE — BNDG,ELSTC,MATRIX,STRL,3"X5YD,LF,HOOK&LP: Brand: MEDLINE

## (undated) DEVICE — TRAILBLAZER L90CM 50MM MRK BND SPC GWIRE 0.035IN

## (undated) DEVICE — KIT MFLD ISOLATN NACL CNTRST PRT TBNG SPIK W/ PRSS TRNSDUC

## (undated) DEVICE — DESTINATION RENAL GUIDING SHEATH: Brand: DESTINATION

## (undated) DEVICE — DRESSING,GAUZE,XEROFORM,CURAD,1"X8",ST: Brand: CURAD

## (undated) DEVICE — Device

## (undated) DEVICE — TRAILBLAZER L135CM OD2.6FR ID0.018IN GUID SHTH DIA5FR

## (undated) DEVICE — KIT ANGIO W/ AT P65 PREM HND CTRL FOR CNTRST DEL ANGIOTOUCH

## (undated) DEVICE — PINNACLE INTRODUCER SHEATH: Brand: PINNACLE

## (undated) DEVICE — MICROPUNCTURE INTRODUCER SET SILHOUETTE TRANSITIONLESS PUSH-PLUS DESIGN - STIFFENED CANNULA WITH STAINLESS STEEL WIRE GUIDE: Brand: MICROPUNCTURE

## (undated) DEVICE — TUBING PRSS MON L48IN PVC RIG NONEXPANDING M TO FEM CONN

## (undated) DEVICE — 4-PORT MANIFOLD: Brand: NEPTUNE 2

## (undated) DEVICE — CATHETER VASC DIAG SHEP FLSH PERIPH W/O HYDRPHLC COAT AD

## (undated) DEVICE — SYRINGE MED 30ML STD CLR PLAS LUERLOCK TIP N CTRL DISP

## (undated) DEVICE — RADIFOCUS GLIDEWIRE ADVANTAGE GUIDEWIRE: Brand: GLIDEWIRE ADVANTAGE

## (undated) DEVICE — GOWN,SIRUS,FABRNF,XL,20/CS: Brand: MEDLINE

## (undated) DEVICE — GLOVE ORTHO 7   MSG9470

## (undated) DEVICE — BLADE,STAINLESS-STEEL,10,STRL,DISPOSABLE: Brand: MEDLINE

## (undated) DEVICE — DRAPE,EXTREMITY,89X128,STERILE: Brand: MEDLINE

## (undated) DEVICE — INFLATION DEVICE KIT: Brand: ENCORE™ 26 ADVANTAGE KIT

## (undated) DEVICE — CANNULA NSL CANN NSL L25FT TBNG AD O2 SUP SFT UC

## (undated) DEVICE — GAUZE,SPONGE,4"X4",8PLY,STRL,LF,10/TRAY: Brand: MEDLINE

## (undated) DEVICE — ELECTRODE PT RET AD L9FT HI MOIST COND ADH HYDRGEL CORDED

## (undated) DEVICE — BANDAGE,GAUZE,BULKEE II,4.5"X4.1YD,STRL: Brand: MEDLINE

## (undated) DEVICE — NEEDLE HYPO 23GA L1.5IN TURQ POLYPR HUB S STL THN WALL IM

## (undated) DEVICE — DOUBLE BASIN SET: Brand: MEDLINE INDUSTRIES, INC.